# Patient Record
Sex: FEMALE | Race: WHITE | NOT HISPANIC OR LATINO | Employment: UNEMPLOYED | URBAN - NONMETROPOLITAN AREA
[De-identification: names, ages, dates, MRNs, and addresses within clinical notes are randomized per-mention and may not be internally consistent; named-entity substitution may affect disease eponyms.]

---
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
## 2022-02-10 ENCOUNTER — TELEPHONE (OUTPATIENT)
Dept: OBSTETRICS AND GYNECOLOGY | Facility: CLINIC | Age: 22
End: 2022-02-10

## 2022-02-10 NOTE — TELEPHONE ENCOUNTER
Patient took 2 home pregnancy tests. One was negative and one positive. She asked for labs to confirm quant levels. Does she need to be seen prior or can lab orders be put in? Patient can be reached at #[phone] to discuss further if necessary.    Thanks,  Lita

## 2022-02-11 NOTE — TELEPHONE ENCOUNTER
I spoke with patient and informed her to wait a week from today before taking another pregnancy test. Her last LMP was mid January. I told her if its still positive we would run a quant. She had two positives and a negative at home pregnancy tests.

## 2022-03-28 ENCOUNTER — TELEPHONE (OUTPATIENT)
Dept: OBSTETRICS AND GYNECOLOGY | Facility: CLINIC | Age: 22
End: 2022-03-28

## 2022-03-28 ENCOUNTER — LAB (OUTPATIENT)
Dept: LAB | Facility: HOSPITAL | Age: 22
End: 2022-03-28

## 2022-03-28 ENCOUNTER — CLINICAL SUPPORT (OUTPATIENT)
Dept: OBSTETRICS AND GYNECOLOGY | Facility: CLINIC | Age: 22
End: 2022-03-28

## 2022-03-28 VITALS
WEIGHT: 206 LBS | DIASTOLIC BLOOD PRESSURE: 66 MMHG | SYSTOLIC BLOOD PRESSURE: 114 MMHG | HEIGHT: 61 IN | BODY MASS INDEX: 38.89 KG/M2

## 2022-03-28 DIAGNOSIS — Z32.00 PREGNANCY EXAMINATION OR TEST, PREGNANCY UNCONFIRMED: Primary | ICD-10-CM

## 2022-03-28 LAB
B-HCG UR QL: NEGATIVE
EXPIRATION DATE: ABNORMAL
HCG INTACT+B SERPL-ACNC: <0.1 MIU/ML
INTERNAL NEGATIVE CONTROL: NEGATIVE
INTERNAL POSITIVE CONTROL: POSITIVE
Lab: ABNORMAL

## 2022-03-28 PROCEDURE — 36415 COLL VENOUS BLD VENIPUNCTURE: CPT

## 2022-03-28 PROCEDURE — 81025 URINE PREGNANCY TEST: CPT | Performed by: NURSE PRACTITIONER

## 2022-03-28 PROCEDURE — 84702 CHORIONIC GONADOTROPIN TEST: CPT | Performed by: NURSE PRACTITIONER

## 2022-03-28 NOTE — TELEPHONE ENCOUNTER
I called patient with her beta HCG quant level results. Her quant level today is negative. I asked the patient if she would like to get on birth control to help regulate her periods. She told me she is not sure right now. I told her that if she decides to, to call us so we can get her an appointment with a provider. I told the patient that if she has any other questions to please give me a call.

## 2022-03-28 NOTE — PROGRESS NOTES
Patient comes in for a newob appointment. She says she had a positive pregnancy test. Her LMP is approximately 1/28/22. Her UPT is negative in the office today. She says she has had 7 miscarriages. She only saw a doctor for followup for one of the miscarriages. A beta HCG quant is ordered today. I told the patient that I would call her with results. Patient is agreeable to this.

## 2022-04-09 ENCOUNTER — HOSPITAL ENCOUNTER (EMERGENCY)
Facility: HOSPITAL | Age: 22
Discharge: HOME | End: 2022-04-09
Attending: EMERGENCY MEDICINE | Admitting: EMERGENCY MEDICINE

## 2022-04-09 VITALS
BODY MASS INDEX: 38.89 KG/M2 | RESPIRATION RATE: 16 BRPM | DIASTOLIC BLOOD PRESSURE: 57 MMHG | HEIGHT: 61 IN | OXYGEN SATURATION: 97 % | HEART RATE: 78 BPM | WEIGHT: 206 LBS | SYSTOLIC BLOOD PRESSURE: 102 MMHG | TEMPERATURE: 97.3 F

## 2022-04-09 DIAGNOSIS — N93.9 VAGINAL BLEEDING: Primary | ICD-10-CM

## 2022-04-09 LAB — HCG SERPL QL: NEGATIVE

## 2022-04-09 PROCEDURE — 99282 EMERGENCY DEPT VISIT SF MDM: CPT

## 2022-04-09 PROCEDURE — 36415 COLL VENOUS BLD VENIPUNCTURE: CPT

## 2022-04-09 PROCEDURE — 84703 CHORIONIC GONADOTROPIN ASSAY: CPT | Performed by: EMERGENCY MEDICINE

## 2022-04-09 RX ORDER — SODIUM CHLORIDE 0.9 % (FLUSH) 0.9 %
10 SYRINGE (ML) INJECTION AS NEEDED
Status: DISCONTINUED | OUTPATIENT
Start: 2022-04-09 | End: 2022-04-09 | Stop reason: HOSPADM

## 2022-04-09 NOTE — ED PROVIDER NOTES
Subjective   22-year-old female presents to the emergency department with concern for vaginal bleeding with reported positive home pregnancy test.  She initially stated that the home pregnancy test was on 15 March but [facility]  After I question this she reports that she did get mixed up and she actually has taken 8 further pregnancy test since the 28th that it been positive at home.  She reports she started bleeding this evening and having some cramping and is concerned about issues with miscarriage due to reported previous 7 miscarriages.  She does state that she has not followed with OB/GYN during any of these reported pregnancies.  She did have 1 visit to this emergency department for one of them but the pregnancy test in the ED was negative at that time.  She is not on any birth control currently.  Reports her menstrual cycle usually occurs at the beginning of each month and that she has not had one since the beginning of February.  She reports her blood type is O-.  When asked if she is ever received RhoGam with her previous miscarriages she does not know what this medicine is.  Denies receiving any previous injections with her miscarriages.    Family history, surgical history, social history, current medications and allergies are reviewed with the patient and triage documentation and vitals are reviewed.      History provided by:  Patient and medical records   used: No        Review of Systems   Constitutional: Negative for chills and fever.   HENT: Negative for congestion and sore throat.    Eyes: Negative.    Respiratory: Negative.    Cardiovascular: Negative.    Gastrointestinal: Positive for abdominal pain. Negative for constipation, diarrhea, nausea and vomiting.   Endocrine: Negative.    Genitourinary: Positive for menstrual problem, pelvic pain and vaginal bleeding. Negative for dysuria, frequency and vaginal  discharge.   Musculoskeletal: Negative.    Skin: Negative.    Allergic/Immunologic: Negative.    Neurological: Negative.    Hematological: Negative.    Psychiatric/Behavioral: Negative.        Past Medical History:   Diagnosis Date   • Asthma    • Chlamydia        No Known Allergies    Past Surgical History:   Procedure Laterality Date   • ADENOIDECTOMY     • TONSILLECTOMY         Family History   Problem Relation Age of Onset   • No Known Problems Father    • No Known Problems Mother    • No Known Problems Brother    • No Known Problems Brother    • No Known Problems Brother    • Heart attack Paternal Grandfather    • Hypertension Maternal Grandmother    • Cancer Maternal Grandfather        Social History     Socioeconomic History   • Marital status: Single   Tobacco Use   • Smoking status: Current Every Day Smoker     Packs/day: 1.00     Types: Cigarettes   • Smokeless tobacco: Never Used   Substance and Sexual Activity   • Alcohol use: Not Currently   • Drug use: Never   • Sexual activity: Yes     Partners: Male           Objective   Physical Exam  Vitals and nursing note reviewed.   Constitutional:       General: She is not in acute distress.     Appearance: Normal appearance. She is normal weight. She is not ill-appearing, toxic-appearing or diaphoretic.   Cardiovascular:      Rate and Rhythm: Normal rate and regular rhythm.      Pulses: Normal pulses.      Heart sounds: No murmur heard.  Pulmonary:      Effort: Pulmonary effort is normal.      Breath sounds: Normal breath sounds.   Abdominal:      General: Bowel sounds are normal.      Palpations: Abdomen is soft.      Tenderness: There is no abdominal tenderness. There is no guarding or rebound.   Musculoskeletal:         General: Normal range of motion.      Cervical back: Normal range of motion.   Skin:     General: Skin is warm and dry.      Capillary Refill: Capillary refill takes less than 2 seconds.      Coloration: Skin is not pale.   Neurological:       Mental Status: She is alert.         Procedures  none         ED Course      Labs Reviewed   HCG, SERUM, QUALITATIVE - Normal     No results found.             MDM  Number of Diagnoses or Management Options     Amount and/or Complexity of Data Reviewed  Clinical lab tests: reviewed    Patient Progress  Patient progress: stable    Pregnancy test in the emergency department is negative.  Patient advised on follow-up with OB/GYN.    Final diagnoses:   Vaginal bleeding       ED Disposition  ED Disposition     ED Disposition   Discharge    Condition   Stable    Comment   --             Jeremie Galicia MD  [address] [place] [id number] [phone]               Medication List      No changes were made to your prescriptions during this visit.          Paul Sparks,   04/09/22 212

## 2022-04-29 ENCOUNTER — OFFICE VISIT (OUTPATIENT)
Dept: OBSTETRICS AND GYNECOLOGY | Facility: CLINIC | Age: 22
End: 2022-04-29

## 2022-04-29 VITALS
WEIGHT: 201 LBS | DIASTOLIC BLOOD PRESSURE: 64 MMHG | SYSTOLIC BLOOD PRESSURE: 108 MMHG | BODY MASS INDEX: 37.95 KG/M2 | HEIGHT: 61 IN

## 2022-04-29 DIAGNOSIS — Z32.00 PREGNANCY EXAMINATION OR TEST, PREGNANCY UNCONFIRMED: ICD-10-CM

## 2022-04-29 DIAGNOSIS — N96 HISTORY OF MULTIPLE MISCARRIAGES: Primary | ICD-10-CM

## 2022-04-29 DIAGNOSIS — Z67.91 BLOOD TYPE, RH NEGATIVE: ICD-10-CM

## 2022-04-29 DIAGNOSIS — Z30.09 BIRTH CONTROL COUNSELING: ICD-10-CM

## 2022-04-29 LAB
B-HCG UR QL: NEGATIVE
EXPIRATION DATE: 0
INTERNAL NEGATIVE CONTROL: NEGATIVE
INTERNAL POSITIVE CONTROL: POSITIVE
Lab: 0

## 2022-04-29 PROCEDURE — 99203 OFFICE O/P NEW LOW 30 MIN: CPT | Performed by: NURSE PRACTITIONER

## 2022-04-29 PROCEDURE — 81025 URINE PREGNANCY TEST: CPT | Performed by: NURSE PRACTITIONER

## 2022-04-29 NOTE — PROGRESS NOTES
Subjective   Shirley Sutton is a 22 y.o. here wants to discuss history of miscarries, desires BC    Shirley Sutton is a 22 yr old premenopausal female who presents today to discuss her history of miscarriages and desires birth control. Last period in February, prior to that sometime in April or May 2021. Wants to know if she would be able to conceive in the future because she has had 5-6 miscarriages in the past. Pt states that her most recent miscarriage  was sometime last year in [place]; she presented to the ER for bleeding and was told her HCG quant was low and she was miscarrying. Pt did not go back for f/u. Also, shares that had at least 3 miscarriages when she was 19 years old but never told anyone and did not seek care for those miscarriages. Reports a history of irregular periods; had been on Depo-Provera for 4 years and got off of it in February 2021. Only has had 2 periods since. Recalls that she had previously gone 6 months without a period and this was when she was not taking birth control. Voices that she desires to be on birth control at this time. Denies having sex in the last 2 weeks. Negative UPT test today.       The following portions of the patient's history were reviewed and updated as appropriate: allergies, current medications, past family history, past medical history, past social history, past surgical history and problem list.    Review of Systems   Constitutional: Negative for chills, fatigue and fever.   Respiratory: Negative for shortness of breath.    Cardiovascular: Negative for chest pain and palpitations.   Gastrointestinal: Negative for abdominal pain, constipation, diarrhea and nausea.   Genitourinary: Negative for dysuria, frequency, menstrual problem, pelvic pressure, vaginal bleeding, vaginal discharge and vaginal pain.   Skin: Negative for rash.   Neurological: Negative for headache.   Psychiatric/Behavioral: Negative for depressed mood.       Objective   Physical  Exam  Vitals and nursing note reviewed.   Constitutional:       Appearance: She is well-developed.   HENT:      Head: Normocephalic.   Pulmonary:      Effort: Pulmonary effort is normal.   Musculoskeletal:         General: Normal range of motion.   Skin:     General: Skin is warm and dry.   Neurological:      Mental Status: She is alert and oriented to person, place, and time.   Psychiatric:         Behavior: Behavior normal.           Assessment/Plan   Diagnoses and all orders for this visit:    1. History of multiple miscarriages (Primary)  -     Cancel: ABO / Rh; Future  -     Cancel: Antibody Screen; Future    2. Blood type, Rh negative  -     Cancel: ABO / Rh; Future  -     Cancel: Antibody Screen; Future    3. Pregnancy examination or test, pregnancy unconfirmed  -     POC Pregnancy, Urine    4. Birth control counseling      Discussed the importance of f/u with miscarriages as her blood type is O negative which Rhogam would be indicated. At this time, patient desires to defer work-up for history of miscarriages as patient does not want to conceive currently. Reviewed contraceptive options to include OCPs, patch, Depo-provera, NuvaRing, IUD, and Nexplanon. Desires Nexplanon at this time but requires PA; will return in 1 week for placement. Counseled patient that if she changes her mind about Nexplanon, it is important she returns for f/u to discuss further management of her irregular periods.    -

## 2022-06-09 ENCOUNTER — HOSPITAL ENCOUNTER (EMERGENCY)
Facility: HOSPITAL | Age: 22
Discharge: HOME | End: 2022-06-09
Attending: EMERGENCY MEDICINE | Admitting: EMERGENCY MEDICINE

## 2022-06-09 VITALS
OXYGEN SATURATION: 99 % | SYSTOLIC BLOOD PRESSURE: 109 MMHG | TEMPERATURE: 98.5 F | WEIGHT: 190 LBS | DIASTOLIC BLOOD PRESSURE: 69 MMHG | HEART RATE: 72 BPM | BODY MASS INDEX: 34.96 KG/M2 | HEIGHT: 62 IN | RESPIRATION RATE: 18 BRPM

## 2022-06-09 DIAGNOSIS — J06.9 VIRAL UPPER RESPIRATORY TRACT INFECTION: Primary | ICD-10-CM

## 2022-06-09 PROCEDURE — 99283 EMERGENCY DEPT VISIT LOW MDM: CPT

## 2022-06-09 RX ORDER — GUAIFENESIN 600 MG/1
1200 TABLET, EXTENDED RELEASE ORAL 2 TIMES DAILY PRN
Qty: 14 TABLET | Refills: 0 | Status: SHIPPED | OUTPATIENT
Start: 2022-06-09 | End: 2022-09-06

## 2022-06-09 RX ORDER — ALBUTEROL SULFATE 90 UG/1
2 AEROSOL, METERED RESPIRATORY (INHALATION) EVERY 4 HOURS PRN
Qty: 6.7 G | Refills: 0 | Status: SHIPPED | OUTPATIENT
Start: 2022-06-09 | End: 2022-09-06

## 2022-06-09 RX ORDER — DEXTROMETHORPHAN HYDROBROMIDE AND PROMETHAZINE HYDROCHLORIDE 15; 6.25 MG/5ML; MG/5ML
5 SYRUP ORAL 4 TIMES DAILY PRN
Qty: 118 ML | Refills: 0 | Status: SHIPPED | OUTPATIENT
Start: 2022-06-09 | End: 2022-09-06

## 2022-06-10 NOTE — ED PROVIDER NOTES
"Subjective   21yo female presents ED c/o 5d hx congestion/productive cough \"clear\" sputum/sinus pressure/otalgia/sore throat.  Denies fever/chills/n/v/d.      History provided by:  Patient  URI  Presenting symptoms: congestion, cough, ear pain and sore throat    Severity:  Moderate  Duration:  5 days  Associated symptoms: no wheezing        Review of Systems   Constitutional: Negative.    HENT: Positive for congestion, ear pain and sore throat.    Respiratory: Positive for cough and shortness of breath. Negative for wheezing.    Cardiovascular: Negative.    Gastrointestinal: Negative.    Genitourinary: Negative.    Musculoskeletal: Negative.    Allergic/Immunologic: Negative for immunocompromised state.   All other systems reviewed and are negative.      Past Medical History:   Diagnosis Date   • Asthma    • Chlamydia        No Known Allergies    Past Surgical History:   Procedure Laterality Date   • ADENOIDECTOMY     • TONSILLECTOMY         Family History   Problem Relation Age of Onset   • No Known Problems Father    • No Known Problems Mother    • No Known Problems Brother    • No Known Problems Brother    • No Known Problems Brother    • Heart attack Paternal Grandfather    • Hypertension Maternal Grandmother    • Cancer Maternal Grandfather        Social History     Socioeconomic History   • Marital status: Single   Tobacco Use   • Smoking status: Current Every Day Smoker     Packs/day: 1.00     Types: Cigarettes   • Smokeless tobacco: Never Used   Substance and Sexual Activity   • Alcohol use: Not Currently   • Drug use: Never   • Sexual activity: Yes     Partners: Male           Objective   Physical Exam  Vitals and nursing note reviewed.   Constitutional:       Appearance: Normal appearance.   HENT:      Head: Normocephalic and atraumatic.      Right Ear: Tympanic membrane, ear canal and external ear normal.      Left Ear: Tympanic membrane, ear canal and external ear normal.      Nose:      Right Sinus: " Maxillary sinus tenderness present. No frontal sinus tenderness.      Left Sinus: Maxillary sinus tenderness present. No frontal sinus tenderness.      Mouth/Throat:      Mouth: Mucous membranes are moist.   Eyes:      Pupils: Pupils are equal, round, and reactive to light.   Cardiovascular:      Rate and Rhythm: Normal rate and regular rhythm.      Pulses: Normal pulses.      Heart sounds: Normal heart sounds. No murmur heard.    No friction rub. No gallop.   Pulmonary:      Effort: Pulmonary effort is normal. No respiratory distress.      Breath sounds: Normal breath sounds. No stridor. No wheezing, rhonchi or rales.   Abdominal:      General: Abdomen is flat. Bowel sounds are normal. There is no distension.      Palpations: Abdomen is soft.      Tenderness: There is no abdominal tenderness. There is no guarding or rebound.   Musculoskeletal:         General: No swelling or deformity.      Cervical back: Normal range of motion and neck supple. No rigidity or tenderness.      Right lower leg: No edema.      Left lower leg: No edema.   Lymphadenopathy:      Cervical: No cervical adenopathy.   Skin:     General: Skin is warm and dry.   Neurological:      General: No focal deficit present.      Mental Status: She is alert and oriented to person, place, and time.      GCS: GCS eye subscore is 4. GCS verbal subscore is 5. GCS motor subscore is 6.         Procedures           ED Course                                                 MDM    Final diagnoses:   Viral upper respiratory tract infection       ED Disposition  ED Disposition     ED Disposition   Discharge    Condition   Stable    Comment   --             Kim Warren MD  [address] [place] [address] [phone]    In 2 days           Medication List      New Prescriptions    albuterol sulfate  (90 Base) MCG/ACT inhaler  Commonly known as: PROVENTIL HFA;VENTOLIN HFA;PROAIR HFA  Inhale 2 puffs Every 4 (Four) Hours As Needed for Wheezing or  Shortness of Air.     guaiFENesin 600 MG 12 hr tablet  Commonly known as: MUCINEX  Take 2 tablets by mouth 2 (Two) Times a Day As Needed for Cough or Congestion.     promethazine-dextromethorphan 6.25-15 MG/5ML syrup  Commonly known as: PROMETHAZINE-DM  Take 5 mL by mouth 4 (Four) Times a Day As Needed for Cough.           Where to Get Your Medications      These medications were sent to [id number] - [place], [place] - [facility] [facility] AT [facility] [address] - [phone]  - [phone] [address] [address]    Phone: [phone]   · albuterol sulfate  (90 Base) MCG/ACT inhaler  · guaiFENesin 600 MG 12 hr tablet  · promethazine-dextromethorphan 6.25-15 MG/5ML syrup          Ernesto Phelps MD  06/09/22 3121

## 2022-09-06 ENCOUNTER — LAB (OUTPATIENT)
Dept: LAB | Facility: HOSPITAL | Age: 22
End: 2022-09-06

## 2022-09-06 ENCOUNTER — INITIAL PRENATAL (OUTPATIENT)
Dept: OBSTETRICS AND GYNECOLOGY | Facility: CLINIC | Age: 22
End: 2022-09-06

## 2022-09-06 VITALS — SYSTOLIC BLOOD PRESSURE: 100 MMHG | DIASTOLIC BLOOD PRESSURE: 62 MMHG | BODY MASS INDEX: 37.39 KG/M2 | WEIGHT: 204.4 LBS

## 2022-09-06 DIAGNOSIS — O36.80X0 ENCOUNTER TO DETERMINE FETAL VIABILITY OF PREGNANCY, SINGLE OR UNSPECIFIED FETUS: ICD-10-CM

## 2022-09-06 DIAGNOSIS — E66.9 OBESITY (BMI 30-39.9): ICD-10-CM

## 2022-09-06 DIAGNOSIS — Z32.01 POSITIVE PREGNANCY TEST: ICD-10-CM

## 2022-09-06 DIAGNOSIS — N96 HISTORY OF RECURRENT MISCARRIAGES: ICD-10-CM

## 2022-09-06 DIAGNOSIS — O09.299 HISTORY OF MISCARRIAGE, CURRENTLY PREGNANT, UNSPECIFIED TRIMESTER: Primary | ICD-10-CM

## 2022-09-06 DIAGNOSIS — Z78.9 DATE OF LAST MENSTRUAL PERIOD (LMP) UNKNOWN: ICD-10-CM

## 2022-09-06 DIAGNOSIS — Z32.00 PREGNANCY EXAMINATION OR TEST, PREGNANCY UNCONFIRMED: ICD-10-CM

## 2022-09-06 LAB
ABO GROUP BLD: NORMAL
AMPHET+METHAMPHET UR QL: NEGATIVE
AMPHETAMINES UR QL: NEGATIVE
B-HCG UR QL: POSITIVE
BARBITURATES UR QL SCN: NEGATIVE
BASOPHILS # BLD AUTO: 0.03 10*3/MM3 (ref 0–0.2)
BASOPHILS NFR BLD AUTO: 0.3 % (ref 0–1.5)
BENZODIAZ UR QL SCN: NEGATIVE
BILIRUB UR QL STRIP: NEGATIVE
BLD GP AB SCN SERPL QL: NEGATIVE
BUPRENORPHINE SERPL-MCNC: NEGATIVE NG/ML
CANNABINOIDS SERPL QL: NEGATIVE
CLARITY UR: CLEAR
COCAINE UR QL: NEGATIVE
COLOR UR: YELLOW
DEPRECATED RDW RBC AUTO: 43.8 FL (ref 37–54)
EOSINOPHIL # BLD AUTO: 0.12 10*3/MM3 (ref 0–0.4)
EOSINOPHIL NFR BLD AUTO: 1.1 % (ref 0.3–6.2)
ERYTHROCYTE [DISTWIDTH] IN BLOOD BY AUTOMATED COUNT: 13.2 % (ref 12.3–15.4)
EXPIRATION DATE: ABNORMAL
GLUCOSE UR STRIP-MCNC: NEGATIVE MG/DL
HBV SURFACE AG SERPL QL IA: NORMAL
HCG INTACT+B SERPL-ACNC: NORMAL MIU/ML
HCT VFR BLD AUTO: 39.4 % (ref 34–46.6)
HCV AB SER DONR QL: NORMAL
HGB BLD-MCNC: 13.1 G/DL (ref 12–15.9)
HGB UR QL STRIP.AUTO: NEGATIVE
HIV1+2 AB SER QL: NORMAL
IMM GRANULOCYTES # BLD AUTO: 0.03 10*3/MM3 (ref 0–0.05)
IMM GRANULOCYTES NFR BLD AUTO: 0.3 % (ref 0–0.5)
INTERNAL NEGATIVE CONTROL: NEGATIVE
INTERNAL POSITIVE CONTROL: POSITIVE
KETONES UR QL STRIP: NEGATIVE
LEUKOCYTE ESTERASE UR QL STRIP.AUTO: NEGATIVE
LYMPHOCYTES # BLD AUTO: 1.48 10*3/MM3 (ref 0.7–3.1)
LYMPHOCYTES NFR BLD AUTO: 13.7 % (ref 19.6–45.3)
Lab: ABNORMAL
Lab: NORMAL
MCH RBC QN AUTO: 30.2 PG (ref 26.6–33)
MCHC RBC AUTO-ENTMCNC: 33.2 G/DL (ref 31.5–35.7)
MCV RBC AUTO: 90.8 FL (ref 79–97)
METHADONE UR QL SCN: NEGATIVE
MONOCYTES # BLD AUTO: 0.76 10*3/MM3 (ref 0.1–0.9)
MONOCYTES NFR BLD AUTO: 7 % (ref 5–12)
NEUTROPHILS NFR BLD AUTO: 77.6 % (ref 42.7–76)
NEUTROPHILS NFR BLD AUTO: 8.42 10*3/MM3 (ref 1.7–7)
NITRITE UR QL STRIP: NEGATIVE
NRBC BLD AUTO-RTO: 0 /100 WBC (ref 0–0.2)
OPIATES UR QL: NEGATIVE
OXYCODONE UR QL SCN: NEGATIVE
PCP UR QL SCN: NEGATIVE
PH UR STRIP.AUTO: 7 [PH] (ref 5–8)
PLATELET # BLD AUTO: 337 10*3/MM3 (ref 140–450)
PMV BLD AUTO: 9.3 FL (ref 6–12)
PROPOXYPH UR QL: NEGATIVE
PROT UR QL STRIP: NEGATIVE
RBC # BLD AUTO: 4.34 10*6/MM3 (ref 3.77–5.28)
RH BLD: NEGATIVE
SP GR UR STRIP: 1.02 (ref 1–1.03)
TRICYCLICS UR QL SCN: NEGATIVE
UROBILINOGEN UR QL STRIP: NORMAL
WBC NRBC COR # BLD: 10.84 10*3/MM3 (ref 3.4–10.8)

## 2022-09-06 PROCEDURE — 81025 URINE PREGNANCY TEST: CPT | Performed by: STUDENT IN AN ORGANIZED HEALTH CARE EDUCATION/TRAINING PROGRAM

## 2022-09-06 PROCEDURE — 87661 TRICHOMONAS VAGINALIS AMPLIF: CPT | Performed by: STUDENT IN AN ORGANIZED HEALTH CARE EDUCATION/TRAINING PROGRAM

## 2022-09-06 PROCEDURE — 84702 CHORIONIC GONADOTROPIN TEST: CPT | Performed by: STUDENT IN AN ORGANIZED HEALTH CARE EDUCATION/TRAINING PROGRAM

## 2022-09-06 PROCEDURE — 86803 HEPATITIS C AB TEST: CPT | Performed by: STUDENT IN AN ORGANIZED HEALTH CARE EDUCATION/TRAINING PROGRAM

## 2022-09-06 PROCEDURE — 80081 OBSTETRIC PANEL INC HIV TSTG: CPT | Performed by: STUDENT IN AN ORGANIZED HEALTH CARE EDUCATION/TRAINING PROGRAM

## 2022-09-06 PROCEDURE — 81003 URINALYSIS AUTO W/O SCOPE: CPT | Performed by: STUDENT IN AN ORGANIZED HEALTH CARE EDUCATION/TRAINING PROGRAM

## 2022-09-06 PROCEDURE — 80306 DRUG TEST PRSMV INSTRMNT: CPT | Performed by: STUDENT IN AN ORGANIZED HEALTH CARE EDUCATION/TRAINING PROGRAM

## 2022-09-06 PROCEDURE — 87491 CHLMYD TRACH DNA AMP PROBE: CPT | Performed by: STUDENT IN AN ORGANIZED HEALTH CARE EDUCATION/TRAINING PROGRAM

## 2022-09-06 PROCEDURE — 87086 URINE CULTURE/COLONY COUNT: CPT | Performed by: STUDENT IN AN ORGANIZED HEALTH CARE EDUCATION/TRAINING PROGRAM

## 2022-09-06 PROCEDURE — 87591 N.GONORRHOEAE DNA AMP PROB: CPT | Performed by: STUDENT IN AN ORGANIZED HEALTH CARE EDUCATION/TRAINING PROGRAM

## 2022-09-06 PROCEDURE — 36415 COLL VENOUS BLD VENIPUNCTURE: CPT

## 2022-09-06 RX ORDER — PRENATAL VIT NO.126/IRON/FOLIC 28MG-0.8MG
TABLET ORAL DAILY
COMMUNITY
End: 2022-09-21

## 2022-09-06 NOTE — PROGRESS NOTES
I spent approximately 60 minutes with the patient acquiring the health and history intake, reviewing prior records, discussing topics related to healthy pregnancy, entering orders, and documentation. Her LMP is unknown. She says she does not have regular periods. She had a positive UPT in the office today.  She says she went to the health department and signed up for [facility], but she forgot to bring proof of pregnancy. She is also considering doing HANDS. She tells me this is her 5th pregnancy. She says she has had 4 miscarriages but only saw a doctor for one of them.   She has history of depression. She is not currently on any medication. She filled out the depression screening questionnaire and scored 7.   A newob bag is given. The 1st trimester teaching was done with the patient. We discussed a healthy diet and exercise and what is recommended. I also discussed Listeriosis and Toxoplasmosis and what fish to avoid due to high mercury levels. I informed patient not to be in hot tubs, saunas, or tanning beds. We discussed that spotting may occur after intercourse which is common, but if heavy bleeding like a period occurs to call the [facility] or hospital if clinic is closed.  I encouraged her to make an appointment with the dentist if she has not had a dental exam and cleaning in the last 6 months.  I instructed the patient that alcohol, illicit drug use, and tobacco smoking are not recommended in pregnancy. She recently stopped smoking cigarettes.  She plans to formula feed. I talked to her about education classes. I told her that we are doing in-person classes now. I gave her a schedule of the upcoming classes. I encouraged the patient to get the TDAP vaccine in the 3rd trimester.  I discussed with the patient that a pediatrician needs to be chosen prior to delivery for the infant to have an appointment scheduled before leaving the hospital.  I discussed lab tests will be done today. She is interested in genetics  testing. She will need an early 1 hr glucola for BMI 36. All questions were answered at this time. She is scheduled for an ultrasound and to see Yasmin GODOY on 9/21/22.

## 2022-09-07 LAB
BACTERIA SPEC AEROBE CULT: NORMAL
C TRACH RRNA CVX QL NAA+PROBE: NEGATIVE
N GONORRHOEA RRNA SPEC QL NAA+PROBE: NEGATIVE
RPR SER QL: NORMAL
RUBV IGG SERPL IA-ACNC: 1.29 INDEX
TRICHOMONAS VAGINALIS PCR: NEGATIVE

## 2022-09-10 ENCOUNTER — HOSPITAL ENCOUNTER (EMERGENCY)
Facility: HOSPITAL | Age: 22
Discharge: HOME | End: 2022-09-11
Attending: EMERGENCY MEDICINE | Admitting: EMERGENCY MEDICINE

## 2022-09-10 DIAGNOSIS — R79.89 LFTS ABNORMAL: ICD-10-CM

## 2022-09-10 DIAGNOSIS — O21.9 NAUSEA/VOMITING IN PREGNANCY: Primary | ICD-10-CM

## 2022-09-10 PROCEDURE — 96361 HYDRATE IV INFUSION ADD-ON: CPT

## 2022-09-10 PROCEDURE — 99283 EMERGENCY DEPT VISIT LOW MDM: CPT

## 2022-09-10 PROCEDURE — 96374 THER/PROPH/DIAG INJ IV PUSH: CPT

## 2022-09-11 ENCOUNTER — APPOINTMENT (OUTPATIENT)
Dept: ULTRASOUND IMAGING | Facility: HOSPITAL | Age: 22
End: 2022-09-11

## 2022-09-11 VITALS
SYSTOLIC BLOOD PRESSURE: 102 MMHG | DIASTOLIC BLOOD PRESSURE: 49 MMHG | TEMPERATURE: 98.2 F | HEART RATE: 66 BPM | WEIGHT: 205 LBS | RESPIRATION RATE: 18 BRPM | HEIGHT: 62 IN | BODY MASS INDEX: 37.73 KG/M2 | OXYGEN SATURATION: 99 %

## 2022-09-11 LAB
ALBUMIN SERPL-MCNC: 4.1 G/DL (ref 3.5–5.2)
ALBUMIN/GLOB SERPL: 1.6 G/DL
ALP SERPL-CCNC: 75 U/L (ref 39–117)
ALT SERPL W P-5'-P-CCNC: 157 U/L (ref 1–33)
ANION GAP SERPL CALCULATED.3IONS-SCNC: 12 MMOL/L (ref 5–15)
AST SERPL-CCNC: 153 U/L (ref 1–32)
BACTERIA UR QL AUTO: ABNORMAL /HPF
BASOPHILS # BLD AUTO: 0.04 10*3/MM3 (ref 0–0.2)
BASOPHILS NFR BLD AUTO: 0.3 % (ref 0–1.5)
BILIRUB SERPL-MCNC: 0.3 MG/DL (ref 0–1.2)
BILIRUB UR QL STRIP: NEGATIVE
BUN SERPL-MCNC: 5 MG/DL (ref 6–20)
BUN/CREAT SERPL: 7.9 (ref 7–25)
CALCIUM SPEC-SCNC: 10.2 MG/DL (ref 8.6–10.5)
CHLORIDE SERPL-SCNC: 100 MMOL/L (ref 98–107)
CLARITY UR: ABNORMAL
CO2 SERPL-SCNC: 25 MMOL/L (ref 22–29)
COLOR UR: ABNORMAL
CREAT SERPL-MCNC: 0.63 MG/DL (ref 0.57–1)
DEPRECATED RDW RBC AUTO: 44 FL (ref 37–54)
EGFRCR SERPLBLD CKD-EPI 2021: 130.4 ML/MIN/1.73
EOSINOPHIL # BLD AUTO: 0.03 10*3/MM3 (ref 0–0.4)
EOSINOPHIL NFR BLD AUTO: 0.3 % (ref 0.3–6.2)
ERYTHROCYTE [DISTWIDTH] IN BLOOD BY AUTOMATED COUNT: 13.4 % (ref 12.3–15.4)
GLOBULIN UR ELPH-MCNC: 2.6 GM/DL
GLUCOSE SERPL-MCNC: 84 MG/DL (ref 65–99)
GLUCOSE UR STRIP-MCNC: NEGATIVE MG/DL
HAV IGM SERPL QL IA: NORMAL
HBV CORE IGM SERPL QL IA: NORMAL
HBV SURFACE AG SERPL QL IA: NORMAL
HCG INTACT+B SERPL-ACNC: NORMAL MIU/ML
HCT VFR BLD AUTO: 35.6 % (ref 34–46.6)
HCV AB SER DONR QL: NORMAL
HGB BLD-MCNC: 12.1 G/DL (ref 12–15.9)
HGB UR QL STRIP.AUTO: NEGATIVE
HYALINE CASTS UR QL AUTO: ABNORMAL /LPF
IMM GRANULOCYTES # BLD AUTO: 0.08 10*3/MM3 (ref 0–0.05)
IMM GRANULOCYTES NFR BLD AUTO: 0.7 % (ref 0–0.5)
KETONES UR QL STRIP: ABNORMAL
LEUKOCYTE ESTERASE UR QL STRIP.AUTO: ABNORMAL
LIPASE SERPL-CCNC: 11 U/L (ref 13–60)
LYMPHOCYTES # BLD AUTO: 2.26 10*3/MM3 (ref 0.7–3.1)
LYMPHOCYTES NFR BLD AUTO: 19.5 % (ref 19.6–45.3)
MCH RBC QN AUTO: 30.6 PG (ref 26.6–33)
MCHC RBC AUTO-ENTMCNC: 34 G/DL (ref 31.5–35.7)
MCV RBC AUTO: 89.9 FL (ref 79–97)
MONOCYTES # BLD AUTO: 0.91 10*3/MM3 (ref 0.1–0.9)
MONOCYTES NFR BLD AUTO: 7.9 % (ref 5–12)
NEUTROPHILS NFR BLD AUTO: 71.3 % (ref 42.7–76)
NEUTROPHILS NFR BLD AUTO: 8.25 10*3/MM3 (ref 1.7–7)
NITRITE UR QL STRIP: NEGATIVE
NRBC BLD AUTO-RTO: 0 /100 WBC (ref 0–0.2)
PH UR STRIP.AUTO: 6 [PH] (ref 5–9)
PLATELET # BLD AUTO: 316 10*3/MM3 (ref 140–450)
PMV BLD AUTO: 9 FL (ref 6–12)
POTASSIUM SERPL-SCNC: 3.6 MMOL/L (ref 3.5–5.2)
PROT SERPL-MCNC: 6.7 G/DL (ref 6–8.5)
PROT UR QL STRIP: NEGATIVE
RBC # BLD AUTO: 3.96 10*6/MM3 (ref 3.77–5.28)
RBC # UR STRIP: ABNORMAL /HPF
REF LAB TEST METHOD: ABNORMAL
SODIUM SERPL-SCNC: 137 MMOL/L (ref 136–145)
SP GR UR STRIP: 1.03 (ref 1–1.03)
SQUAMOUS #/AREA URNS HPF: ABNORMAL /HPF
UROBILINOGEN UR QL STRIP: ABNORMAL
WBC # UR STRIP: ABNORMAL /HPF
WBC NRBC COR # BLD: 11.57 10*3/MM3 (ref 3.4–10.8)

## 2022-09-11 PROCEDURE — 83690 ASSAY OF LIPASE: CPT | Performed by: EMERGENCY MEDICINE

## 2022-09-11 PROCEDURE — 80053 COMPREHEN METABOLIC PANEL: CPT | Performed by: EMERGENCY MEDICINE

## 2022-09-11 PROCEDURE — 96361 HYDRATE IV INFUSION ADD-ON: CPT

## 2022-09-11 PROCEDURE — 25010000002 METOCLOPRAMIDE PER 10 MG: Performed by: EMERGENCY MEDICINE

## 2022-09-11 PROCEDURE — 76705 ECHO EXAM OF ABDOMEN: CPT

## 2022-09-11 PROCEDURE — 80074 ACUTE HEPATITIS PANEL: CPT | Performed by: EMERGENCY MEDICINE

## 2022-09-11 PROCEDURE — 85025 COMPLETE CBC W/AUTO DIFF WBC: CPT | Performed by: EMERGENCY MEDICINE

## 2022-09-11 PROCEDURE — 81001 URINALYSIS AUTO W/SCOPE: CPT | Performed by: EMERGENCY MEDICINE

## 2022-09-11 PROCEDURE — 84702 CHORIONIC GONADOTROPIN TEST: CPT | Performed by: EMERGENCY MEDICINE

## 2022-09-11 PROCEDURE — 96374 THER/PROPH/DIAG INJ IV PUSH: CPT

## 2022-09-11 RX ORDER — METOCLOPRAMIDE HYDROCHLORIDE 5 MG/ML
10 INJECTION INTRAMUSCULAR; INTRAVENOUS ONCE
Status: COMPLETED | OUTPATIENT
Start: 2022-09-11 | End: 2022-09-11

## 2022-09-11 RX ORDER — SODIUM CHLORIDE 0.9 % (FLUSH) 0.9 %
10 SYRINGE (ML) INJECTION AS NEEDED
Status: DISCONTINUED | OUTPATIENT
Start: 2022-09-11 | End: 2022-09-11 | Stop reason: HOSPADM

## 2022-09-11 RX ORDER — DOXYLAMINE SUCCINATE AND PYRIDOXINE HYDROCHLORIDE, DELAYED RELEASE TABLETS 10 MG/10 MG 10; 10 MG/1; MG/1
2 TABLET, DELAYED RELEASE ORAL NIGHTLY PRN
Qty: 30 TABLET | Refills: 0 | OUTPATIENT
Start: 2022-09-11 | End: 2022-09-30

## 2022-09-11 RX ADMIN — SODIUM CHLORIDE 1000 ML: 9 INJECTION, SOLUTION INTRAVENOUS at 00:57

## 2022-09-11 RX ADMIN — METOCLOPRAMIDE 10 MG: 5 INJECTION, SOLUTION INTRAMUSCULAR; INTRAVENOUS at 00:58

## 2022-09-11 NOTE — DISCHARGE INSTRUCTIONS
Return ED fever, abdominal pain, jaundice, dehydration, vaginal bleeding, contractions, worse condition, any other concerns

## 2022-09-11 NOTE — ED PROVIDER NOTES
Subjective   PIT    23yo female , unknown LNMP, stating she is approximately 8-9wks pregnant (estimated), presents ED c/o 2wk hx persistent nausea/vomiting.  Pt denies fever/cough/abd pain/diarrhea/vaginal bleeding/contractions/loss of fluid/dysuria.      History provided by:  Patient  Vomiting  The primary symptoms include nausea and vomiting. Primary symptoms do not include abdominal pain, diarrhea or dysuria.       Review of Systems   Constitutional: Negative.    HENT: Negative.    Eyes: Negative for redness.   Respiratory: Negative.    Cardiovascular: Negative.    Gastrointestinal: Positive for nausea and vomiting. Negative for abdominal pain and diarrhea.   Genitourinary: Negative.  Negative for dysuria, pelvic pain and vaginal bleeding.   Musculoskeletal: Negative.    Skin: Negative.    Allergic/Immunologic: Negative for immunocompromised state.   All other systems reviewed and are negative.      Past Medical History:   Diagnosis Date   • Asthma    • Chlamydia    • Depression    • Recurrent pregnancy loss, antepartum condition or complication        No Known Allergies    Past Surgical History:   Procedure Laterality Date   • ADENOIDECTOMY     • TONSILLECTOMY         Family History   Problem Relation Age of Onset   • No Known Problems Father    • No Known Problems Mother    • No Known Problems Brother    • No Known Problems Brother    • No Known Problems Brother    • Heart attack Paternal Grandfather    • Hypertension Maternal Grandmother    • Cancer Maternal Grandfather        Social History     Socioeconomic History   • Marital status: Single   Tobacco Use   • Smoking status: Former Smoker     Packs/day: 1.00     Types: Cigarettes     Quit date: 2022     Years since quittin.0   • Smokeless tobacco: Never Used   Vaping Use   • Vaping Use: Never used   Substance and Sexual Activity   • Alcohol use: Not Currently   • Drug use: Never   • Sexual activity: Yes     Partners: Male           Objective    Physical Exam  Vitals and nursing note reviewed.   Constitutional:       Appearance: Normal appearance.   HENT:      Head: Normocephalic and atraumatic.      Mouth/Throat:      Mouth: Mucous membranes are moist.   Eyes:      Pupils: Pupils are equal, round, and reactive to light.   Cardiovascular:      Rate and Rhythm: Normal rate and regular rhythm.      Pulses: Normal pulses.      Heart sounds: Normal heart sounds. No murmur heard.    No friction rub. No gallop.   Pulmonary:      Effort: Pulmonary effort is normal. No respiratory distress.      Breath sounds: Normal breath sounds. No wheezing, rhonchi or rales.   Abdominal:      General: Abdomen is flat. Bowel sounds are normal. There is no distension.      Palpations: Abdomen is soft.      Tenderness: There is no abdominal tenderness. There is no guarding or rebound.   Musculoskeletal:      Cervical back: Normal range of motion and neck supple. No rigidity.      Right lower leg: No edema.      Left lower leg: No edema.   Lymphadenopathy:      Cervical: No cervical adenopathy.   Skin:     General: Skin is warm and dry.   Neurological:      General: No focal deficit present.      Mental Status: She is alert and oriented to person, place, and time.      GCS: GCS eye subscore is 4. GCS verbal subscore is 5. GCS motor subscore is 6.         Procedures           ED Course      Labs Reviewed   COMPREHENSIVE METABOLIC PANEL - Abnormal; Notable for the following components:       Result Value    BUN 5 (*)     ALT (SGPT) 157 (*)     AST (SGOT) 153 (*)     All other components within normal limits    Narrative:     GFR Normal >60  Chronic Kidney Disease <60  Kidney Failure <15     LIPASE - Abnormal; Notable for the following components:    Lipase 11 (*)     All other components within normal limits   URINALYSIS W/ MICROSCOPIC IF INDICATED (NO CULTURE) - Abnormal; Notable for the following components:    Appearance, UA Cloudy (*)     Ketones, UA Trace (*)     Leuk  Esterase, UA Trace (*)     All other components within normal limits   CBC WITH AUTO DIFFERENTIAL - Abnormal; Notable for the following components:    WBC 11.57 (*)     Lymphocyte % 19.5 (*)     Immature Grans % 0.7 (*)     Neutrophils, Absolute 8.25 (*)     Monocytes, Absolute 0.91 (*)     Immature Grans, Absolute 0.08 (*)     All other components within normal limits   URINALYSIS, MICROSCOPIC ONLY - Abnormal; Notable for the following components:    RBC, UA 3-5 (*)     Bacteria, UA Trace (*)     Squamous Epithelial Cells, UA 6-12 (*)     All other components within normal limits   HEPATITIS PANEL, ACUTE - Normal    Narrative:     Results may be falsely decreased if patient taking Biotin.    HCG, QUANTITATIVE, PREGNANCY    Narrative:     HCG Ranges by Gestational Age    Females - non-pregnant premenopausal   </= 1mIU/mL HCG  Females - postmenopausal               </= 7mIU/mL HCG    3 Weeks         5.8 -    71.2 mIU/mL  4 Weeks         9.5 -     750 mIU/mL  5 Weeks         217 -   7,138 mIU/mL  6 Weeks         158 -  31,795 mIU/mL  7 Weeks       3,697 - 163,563 mIU/mL  8 Weeks      32,065 - 149,571 mIU/mL  9 Weeks      63,803 - 151,410 mIU/mL  10 Weeks     46,509 - 186,977 mIU/mL  12 Weeks     27,832 - 210,612 mIU/mL  14 Weeks     13,950 -  62,530 mIU/mL  15 Weeks     12,039 -  70,971 mIU/mL  16 Weeks      9,040 -  56,451 mIU/mL  17 Weeks      8,175 -  55,868 mIU/mL  18 Weeks      8,099 -  58,176 mIU/mL  Results may be falsely decreased if patient taking Biotin.     CBC AND DIFFERENTIAL    Narrative:     The following orders were created for panel order CBC & Differential.  Procedure                               Abnormality         Status                     ---------                               -----------         ------                     CBC Auto Differential[[id number]]        Abnormal            Final result                 Please view results for these tests on the individual orders.     US Liver    Result  Date: 9/11/2022  Narrative: EXAM DESCRIPTION: US LIVER RadLex: US LIVER Technique:  Multiple sonographic images of the right upper quadrant were obtained. CLINICAL HISTORY: hepatitis. COMPARISON: CT abdomen 9/15/2020. FINDINGS: The liver demonstrates a coarsened echotexture without discrete mass. Hepatic and portal veins appear patent. The pancreas is suboptimally visualized. The proximal abdominal aorta and inferior vena cava appear unremarkable. The gallbladder demonstrates sludge. No shadowing gallstones are seen. Gallbladder wall measures 2.2 mm. No sonographic Almonte's sign was elicited. Common bile duct measures 4.5 mm. No evidence for hydronephrosis in the right kidney. Right kidney measures 9.8 cm in length.     Impression: 1.  No hepatic mass identified. Gallbladder sludge without gallbladder wall thickening. Electronically signed by:  Abelardo Thorne DO  9/11/2022 3:08 AM CDT Workstation: [id number]                                         [facility]    Final diagnoses:   Nausea/vomiting in pregnancy   LFTs abnormal       ED Disposition  ED Disposition     ED Disposition   Discharge    Condition   Good    Comment   --             Daysi Abbasi MD  [address] [place], [place] [id number] [phone]    In 2 days           Medication List      New Prescriptions    doxylamine-pyridoxine 10-10 MG tablet delayed-release EC tablet  Commonly known as: DICLEGIS  Take 2 tablets by mouth At Night As Needed (nausea vomiting).           Where to Get Your Medications      These medications were sent to [id number] - [place], [place] - [address] [facility] AT [facility] [address] - [phone]  - [phone] FX  [address] [facility] [address]    Phone: [phone]   · doxylamine-pyridoxine 10-10 MG tablet delayed-release EC tablet          Ernesto Phelps MD  09/11/22 4444

## 2022-09-21 ENCOUNTER — INITIAL PRENATAL (OUTPATIENT)
Dept: OBSTETRICS AND GYNECOLOGY | Facility: CLINIC | Age: 22
End: 2022-09-21

## 2022-09-21 VITALS — SYSTOLIC BLOOD PRESSURE: 120 MMHG | BODY MASS INDEX: 36.21 KG/M2 | WEIGHT: 198 LBS | DIASTOLIC BLOOD PRESSURE: 72 MMHG

## 2022-09-21 DIAGNOSIS — N96 HISTORY OF RECURRENT MISCARRIAGES: ICD-10-CM

## 2022-09-21 DIAGNOSIS — R79.89 ELEVATED LIVER FUNCTION TESTS: ICD-10-CM

## 2022-09-21 DIAGNOSIS — Z78.9 ELECTRONIC CIGARETTE USE: ICD-10-CM

## 2022-09-21 DIAGNOSIS — O99.211 MATERNAL OBESITY WITHOUT HYPERTENSION, IN FIRST TRIMESTER: ICD-10-CM

## 2022-09-21 DIAGNOSIS — Z3A.10 10 WEEKS GESTATION OF PREGNANCY: Primary | ICD-10-CM

## 2022-09-21 DIAGNOSIS — Z67.91 RH NEGATIVE STATUS DURING PREGNANCY IN FIRST TRIMESTER: ICD-10-CM

## 2022-09-21 DIAGNOSIS — O26.891 RH NEGATIVE STATUS DURING PREGNANCY IN FIRST TRIMESTER: ICD-10-CM

## 2022-09-21 PROCEDURE — 0501F PRENATAL FLOW SHEET: CPT | Performed by: NURSE PRACTITIONER

## 2022-09-21 NOTE — PROGRESS NOTES
[facility]  Obstetrics  Date of Service: 2022    CHIEF COMPLAINT:  New prenatal visit    HISTORY OF PRESENT ILLNESS:  Shirley Sutton is a 22 y.o. y/o  at 10w5d by LMP (No LMP recorded (lmp unknown). Patient is pregnant.  This was a planned pregnancy and the patient is supported by significant other.  Declines nausea with vomiting, she reports its resolved when she stopped taking PNV.  She denies any vaginal bleeding.      REVIEW OF SYSTEMS  Review of Systems   Constitutional: Negative for activity change, appetite change, diaphoresis, fatigue, unexpected weight gain and unexpected weight loss.   Respiratory: Negative for chest tightness and shortness of breath.    Cardiovascular: Negative for chest pain and palpitations.   Gastrointestinal: Negative for abdominal distention, abdominal pain, constipation and diarrhea.   Genitourinary: Negative for amenorrhea, breast discharge, breast lump, breast pain, decreased libido, decreased urine volume, difficulty urinating, dyspareunia, dysuria, flank pain, frequency, genital sores, hematuria, pelvic pain, pelvic pressure, urgency, urinary incontinence, vaginal bleeding, vaginal discharge and vaginal pain.   Musculoskeletal: Negative for myalgias.   Skin: Negative for color change, dry skin and skin lesions.   Neurological: Negative for light-headedness and headache.   Psychiatric/Behavioral: Negative for agitation, dysphoric mood, sleep disturbance, depressed mood and stress. The patient is not nervous/anxious.        PRENATAL RISK FACTORS   Problems (from 24 to present)     No problems associated with this episode.          DATING CRITERIA:  LMP unknown  1TUS (2022 at 10w5d) -- ORQUIDEA 2023    OBSTETRIC HISTORY:  OB History    Para Term  AB Living   5       4     SAB IAB Ectopic Molar Multiple Live Births   4                # Outcome Date GA Lbr Alejandro/2nd Weight Sex Delivery Anes PTL Lv   5  Current            4 2022              Birth Comments: pt says she has had 4 miscarriages; she was only seen by a doctor for 1 miscarriage   3 2021           2 2020           1 2020              Birth Comments: saw a doctor in [place]     GYN HISTORY:    Last pap smear:   Last Completed Pap Smear     This patient has no relevant Health Maintenance data.        Denies h/o gynecologic surgeries, including biopsies of the cervix    PAST MEDICAL HISTORY:  Past Medical History:   Diagnosis Date   • Asthma    • Chlamydia    • Depression    • Recurrent pregnancy loss, antepartum condition or complication      PAST SURGICAL HISTORY:  Past Surgical History:   Procedure Laterality Date   • ADENOIDECTOMY     • TONSILLECTOMY       FAMILY HISTORY:  Family History   Problem Relation Age of Onset   • No Known Problems Father    • No Known Problems Mother    • No Known Problems Brother    • No Known Problems Brother    • No Known Problems Brother    • Heart attack Paternal Grandfather    • Hypertension Maternal Grandmother    • Cancer Maternal Grandfather      SOCIAL HISTORY:  Social History     Socioeconomic History   • Marital status: Single   Tobacco Use   • Smoking status: Former Smoker     Packs/day: 1.00     Types: Cigarettes     Quit date: 2022     Years since quittin.0   • Smokeless tobacco: Never Used   Vaping Use   • Vaping Use: Never used   Substance and Sexual Activity   • Alcohol use: Not Currently   • Drug use: Never   • Sexual activity: Yes     Partners: Male     GENETIC SCREENING:  Age >34 yo as of ORQUIDEA: no  Thalassemia: no  NTD: no  CHD: no  Down Syndrome/MR/Fragile X/Autism: no  Ashkenazi Gnosticist with Kai-Sachs, [place], familial dysautonomia: no  Sickle cell disease or trait: no  Hemophilia: no  Muscular dystrophy: no  Cystic fibrosis: no  Yen's chorea: no  Birth defects: no  Genetic/chromosomal disorders: no    INFECTION HISTORY:  TB exposure: no  HSV: no  Illness since LMP:  no  Prior GBS infected child: no  STIs: yes, treated in the past     ALLERGIES:  No Known Allergies    MEDICATIONS:  Prior to Admission medications    Medication Sig Start Date End Date Taking? Authorizing Provider   doxylamine-pyridoxine (DICLEGIS) 10-10 MG tablet delayed-release EC tablet Take 2 tablets by mouth At Night As Needed (nausea vomiting). 22   Ernesto Phelps MD   prenatal vitamin (prenatal, CLASSIC, vitamin) tablet Take  by mouth Daily.  22  ProviderLaurel MD       PHYSICAL EXAM:   /72   Wt 89.8 kg (198 lb)   LMP  (LMP Unknown)   BMI 36.21 kg/m²   General: Alert, healthy, no distress, well nourished and well developed.  Neurologic: Alert, oriented to person, place, and time.  Gait normal.  Cranial nerves II-XII grossly intact.  HEENT: Normocephalic, atraumatic.  Extraocular muscles intact, pupils equal and reactive x2.    Teeth: Normal hygiene.  Neck: Supple, no adenopathy, thyroid normal size, non-tender, without nodularity, trachea midline.  Lungs: Normal respiratory effort.  Clear to auscultation bilaterally.  No wheezes, rhonci, or rales.  Heart: Regular rate and rhythm.  No murmer, rub or gallop.  Abdomen: Soft, non-tender, non-distended,no masses, no hepatosplenomegaly, no hernia.  Skin: No rash, no lesions.  Extremities: No cyanosis, clubbing or edema.    Preliminary TVUS dating scan- single IUP with CRL 10w5d,  bpm, gestational and yolk sac seen, bilateral ovaries wnl    IMPRESSION:  Shirley Sutton is a 22 y.o.  at 10w5d for a new prenatal visit.    PLAN:  1.  IUP at 10w5d  - Prenatal labs reviewed, elevated ALT/AST, need early 1 hour ogtt- pt will have done in next 1-2 weeks  - Genetic testing, including cystic fibrosis- patient declined previously   - Restart prenatal vitamins at bedtime  - Weight gain counseling performed.   - Pregravid BMI >30: Recommend 11-20 lb  - Return to clinic in 4 weeks for return prenatal visit  - Reviewed COVID-19  visitation policy  - Reviewed COVID-19 precautions     Diagnosis Plan   1. 10 weeks gestation of pregnancy  Ambulatory Referral to Gastroenterology   2. History of recurrent miscarriages     3. Maternal obesity without hypertension, in first trimester     4. Elevated liver function tests  Ambulatory Referral to Gastroenterology   5. Rh negative status during pregnancy in first trimester     6. Electronic cigarette use         Yasmin Valentine, JAYNE  9/21/2022  10:09 T

## 2022-09-26 ENCOUNTER — OFFICE VISIT (OUTPATIENT)
Dept: GASTROENTEROLOGY | Facility: CLINIC | Age: 22
End: 2022-09-26

## 2022-09-26 ENCOUNTER — LAB (OUTPATIENT)
Dept: LAB | Facility: HOSPITAL | Age: 22
End: 2022-09-26

## 2022-09-26 VITALS
WEIGHT: 202 LBS | DIASTOLIC BLOOD PRESSURE: 64 MMHG | BODY MASS INDEX: 37.17 KG/M2 | SYSTOLIC BLOOD PRESSURE: 114 MMHG | HEIGHT: 62 IN | HEART RATE: 71 BPM

## 2022-09-26 DIAGNOSIS — Z3A.11 11 WEEKS GESTATION OF PREGNANCY: ICD-10-CM

## 2022-09-26 DIAGNOSIS — R79.89 ELEVATED LIVER FUNCTION TESTS: Primary | ICD-10-CM

## 2022-09-26 LAB
ALBUMIN SERPL-MCNC: 3.9 G/DL (ref 3.5–5.2)
ALP SERPL-CCNC: 76 U/L (ref 39–117)
ALT SERPL W P-5'-P-CCNC: 19 U/L (ref 1–33)
AST SERPL-CCNC: 11 U/L (ref 1–32)
BILIRUB CONJ SERPL-MCNC: <0.2 MG/DL (ref 0–0.3)
BILIRUB INDIRECT SERPL-MCNC: NORMAL MG/DL
BILIRUB SERPL-MCNC: <0.2 MG/DL (ref 0–1.2)
PROT SERPL-MCNC: 6.2 G/DL (ref 6–8.5)

## 2022-09-26 PROCEDURE — 80076 HEPATIC FUNCTION PANEL: CPT | Performed by: NURSE PRACTITIONER

## 2022-09-26 PROCEDURE — 36415 COLL VENOUS BLD VENIPUNCTURE: CPT | Performed by: NURSE PRACTITIONER

## 2022-09-26 PROCEDURE — 99213 OFFICE O/P EST LOW 20 MIN: CPT | Performed by: NURSE PRACTITIONER

## 2022-09-26 NOTE — PROGRESS NOTES
Chief Complaint   Patient presents with   • Elevated Hepatic Enzymes       Subjective    Shirley Sutton is a 22 y.o. female. she is here today for follow-up.    History of Present Illness    22-year-old female presents to discuss elevated liver enzymes.  She is 11 weeks gestation states she has occasional abdominal cramping denies any current abdominal pain.  Reports she was having constant intractable nausea vomiting when she sought emergency medical attention given IV fluids and symptomatic support and symptoms improved.  Transaminases were elevated acute hepatitis panel was negative/nonreactive.  Ultrasound noted coarsened echotexture of liver gallbladder sludge otherwise normal exam.  She denies any change in bowel movements or blood in her stool.  Denies any itching.  Alkaline phos albumin were within normal limits.  She denies any significant known GI family history.  Does report history of alcoholism in relatives.  She reports prior to pregnancy she would drink alcohol occasionally but denies any alcohol consumption in several months.  Ultrasound noted gallbladder sludge without any stones liver appeared normal        The following portions of the patient's history were reviewed and updated as appropriate:   Past Medical History:   Diagnosis Date   • Asthma    • Chlamydia    • Depression    • Recurrent pregnancy loss, antepartum condition or complication      Past Surgical History:   Procedure Laterality Date   • ADENOIDECTOMY     • TONSILLECTOMY       Family History   Problem Relation Age of Onset   • No Known Problems Father    • No Known Problems Mother    • No Known Problems Brother    • No Known Problems Brother    • No Known Problems Brother    • Heart attack Paternal Grandfather    • Hypertension Maternal Grandmother    • Cancer Maternal Grandfather      OB History        5    Para        Term                AB   4    Living           SAB   4    IAB        Ectopic        Molar      "   Multiple        Live Births                  Prior to Admission medications    Medication Sig Start Date End Date Taking? Authorizing Provider   doxylamine-pyridoxine (DICLEGIS) 10-10 MG tablet delayed-release EC tablet Take 2 tablets by mouth At Night As Needed (nausea vomiting). 22   Ernesto Phelps MD     No Known Allergies  Social History     Socioeconomic History   • Marital status: Single   Tobacco Use   • Smoking status: Former Smoker     Packs/day: 1.00     Types: Cigarettes     Quit date: 2022     Years since quittin.0   • Smokeless tobacco: Never Used   Vaping Use   • Vaping Use: Never used   Substance and Sexual Activity   • Alcohol use: Not Currently   • Drug use: Never   • Sexual activity: Yes     Partners: Male       Review of Systems  Review of Systems   Constitutional: Positive for fatigue. Negative for activity change, appetite change, chills, diaphoresis, fever and unexpected weight change.   HENT: Negative for sore throat and trouble swallowing.    Respiratory: Negative for shortness of breath.    Gastrointestinal: Positive for abdominal pain (more at night ), nausea and vomiting. Negative for abdominal distention, anal bleeding, blood in stool, constipation, diarrhea and rectal pain.        Chronic acid reflux never taken any medication    Musculoskeletal: Negative for arthralgias.   Skin: Negative for pallor.   Neurological: Negative for light-headedness.        /64 (BP Location: Left arm)   Pulse 71   Ht 157.5 cm (62\")   Wt 91.6 kg (202 lb)   LMP  (LMP Unknown)   BMI 36.95 kg/m²     Objective    Physical Exam  Constitutional:       General: She is not in acute distress.     Appearance: Normal appearance. She is normal weight. She is not ill-appearing.   HENT:      Head: Normocephalic and atraumatic.   Pulmonary:      Effort: Pulmonary effort is normal.   Abdominal:      General: Abdomen is flat. Bowel sounds are normal. There is no distension.      Palpations: Abdomen " is soft. There is no mass.      Tenderness: There is no abdominal tenderness.   Neurological:      Mental Status: She is alert.       Admission on 09/10/2022, Discharged on 09/11/2022   Component Date Value Ref Range Status   • Glucose 09/11/2022 84  65 - 99 mg/dL Final   • BUN 09/11/2022 5 (A) 6 - 20 mg/dL Final   • Creatinine 09/11/2022 0.63  0.57 - 1.00 mg/dL Final   • Sodium 09/11/2022 137  136 - 145 mmol/L Final   • Potassium 09/11/2022 3.6  3.5 - 5.2 mmol/L Final   • Chloride 09/11/2022 100  98 - 107 mmol/L Final   • CO2 09/11/2022 25.0  22.0 - 29.0 mmol/L Final   • Calcium 09/11/2022 10.2  8.6 - 10.5 mg/dL Final   • Total Protein 09/11/2022 6.7  6.0 - 8.5 g/dL Final   • Albumin 09/11/2022 4.10  3.50 - 5.20 g/dL Final   • ALT (SGPT) 09/11/2022 157 (A) 1 - 33 U/L Final   • AST (SGOT) 09/11/2022 153 (A) 1 - 32 U/L Final   • Alkaline Phosphatase 09/11/2022 75  39 - 117 U/L Final   • Total Bilirubin 09/11/2022 0.3  0.0 - 1.2 mg/dL Final   • Globulin 09/11/2022 2.6  gm/dL Final   • A/G Ratio 09/11/2022 1.6  g/dL Final   • BUN/Creatinine Ratio 09/11/2022 7.9  7.0 - 25.0 Final   • Anion Gap 09/11/2022 12.0  5.0 - 15.0 mmol/L Final   • eGFR 09/11/2022 130.4  >60.0 mL/min/1.73 Final    National Kidney Foundation and American Society of Nephrology (ASN) Task Force recommended calculation based on the Chronic Kidney Disease Epidemiology Collaboration (CKD-EPI) equation refit without adjustment for race.   • Lipase 09/11/2022 11 (A) 13 - 60 U/L Final   • Color, UA 09/11/2022 Dark Yellow  Yellow, Straw, Dark Yellow, Paulina Final   • Appearance, UA 09/11/2022 Cloudy (A) Clear Final   • pH, UA 09/11/2022 6.0  5.0 - 9.0 Final   • Specific Gravity, UA 09/11/2022 1.027  1.003 - 1.030 Final   • Glucose, UA 09/11/2022 Negative  Negative Final   • Ketones, UA 09/11/2022 Trace (A) Negative Final   • Bilirubin, UA 09/11/2022 Negative  Negative Final   • Blood, UA 09/11/2022 Negative  Negative Final   • Protein, UA 09/11/2022 Negative   Negative Final   • Leuk Esterase, UA 09/11/2022 Trace (A) Negative Final   • Nitrite, UA 09/11/2022 Negative  Negative Final   • Urobilinogen, UA 09/11/2022 1.0 E.U./dL  0.2 - 1.0 E.U./dL Final   • HCG Quantitative 09/11/2022 68,782.00  mIU/mL Final   • WBC 09/11/2022 11.57 (A) 3.40 - 10.80 10*3/mm3 Final   • RBC 09/11/2022 3.96  3.77 - 5.28 10*6/mm3 Final   • Hemoglobin 09/11/2022 12.1  12.0 - 15.9 g/dL Final   • Hematocrit 09/11/2022 35.6  34.0 - 46.6 % Final   • MCV 09/11/2022 89.9  79.0 - 97.0 fL Final   • MCH 09/11/2022 30.6  26.6 - 33.0 pg Final   • MCHC 09/11/2022 34.0  31.5 - 35.7 g/dL Final   • RDW 09/11/2022 13.4  12.3 - 15.4 % Final   • RDW-SD 09/11/2022 44.0  37.0 - 54.0 fl Final   • MPV 09/11/2022 9.0  6.0 - 12.0 fL Final   • Platelets 09/11/2022 316  140 - 450 10*3/mm3 Final   • Neutrophil % 09/11/2022 71.3  42.7 - 76.0 % Final   • Lymphocyte % 09/11/2022 19.5 (A) 19.6 - 45.3 % Final   • Monocyte % 09/11/2022 7.9  5.0 - 12.0 % Final   • Eosinophil % 09/11/2022 0.3  0.3 - 6.2 % Final   • Basophil % 09/11/2022 0.3  0.0 - 1.5 % Final   • Immature Grans % 09/11/2022 0.7 (A) 0.0 - 0.5 % Final   • Neutrophils, Absolute 09/11/2022 8.25 (A) 1.70 - 7.00 10*3/mm3 Final   • Lymphocytes, Absolute 09/11/2022 2.26  0.70 - 3.10 10*3/mm3 Final   • Monocytes, Absolute 09/11/2022 0.91 (A) 0.10 - 0.90 10*3/mm3 Final   • Eosinophils, Absolute 09/11/2022 0.03  0.00 - 0.40 10*3/mm3 Final   • Basophils, Absolute 09/11/2022 0.04  0.00 - 0.20 10*3/mm3 Final   • Immature Grans, Absolute 09/11/2022 0.08 (A) 0.00 - 0.05 10*3/mm3 Final   • nRBC 09/11/2022 0.0  0.0 - 0.2 /100 WBC Final   • RBC, UA 09/11/2022 3-5 (A) None Seen /HPF Final   • WBC, UA 09/11/2022 0-2  None Seen, 0-2, 3-5 /HPF Final   • Bacteria, UA 09/11/2022 Trace (A) None Seen /HPF Final   • Squamous Epithelial Cells, UA 09/11/2022 6-12 (A) None Seen, 0-2 /HPF Final   • Hyaline Casts, UA 09/11/2022 None Seen  None Seen /LPF Final   • Methodology 09/11/2022 Automated  Microscopy   Final   • Hepatitis B Surface Ag 09/11/2022 Non-Reactive  Non-Reactive Final   • Hep A IgM 09/11/2022 Non-Reactive  Non-Reactive Final   • Hep B C IgM 09/11/2022 Non-Reactive  Non-Reactive Final   • Hepatitis C Ab 09/11/2022 Non-Reactive  Non-Reactive Final     Assessment & Plan      1. Elevated liver function tests    2. 11 weeks gestation of pregnancy    .   Suspect elevated liver enzymes related to hyperemesis syndrome.  We will recheck enzymes today further elevation will plan further work-up.  Continue with reflux medication per gynecologist.  Follow-up in GI office as needed    Orders placed during this encounter include:  Orders Placed This Encounter   Procedures   • Hepatic Function Panel     Order Specific Question:   Release to patient     Answer:   Routine Release       * Surgery not found *    Review and/or summary of lab tests, radiology, procedures, medications. Review and summary of old records and obtaining of history. The risks and benefits of my recommendations, as well as other treatment options were discussed with the patient today. Questions were answered.    No orders of the defined types were placed in this encounter.      Follow-up: Return in about 4 weeks (around 10/24/2022) for Recheck, After test.          This document has been electronically signed by JAYNE Bullard on September 26, 2022 09:30 CDT        I spent 22 minutes caring for Shirley on this date of service. This time includes time spent by me in the following activities:preparing for the visit, reviewing tests, obtaining and/or reviewing a separately obtained history, performing a medically appropriate examination and/or evaluation , counseling and educating the patient/family/caregiver, ordering medications, tests, or procedures, referring and communicating with other health care professionals , documenting information in the medical record and care coordination    Results for orders placed or performed during  the hospital encounter of 09/10/22   Urinalysis, Microscopic Only - Urine, Clean Catch    Specimen: Urine, Clean Catch   Result Value Ref Range    RBC, UA 3-5 (A) None Seen /HPF    WBC, UA 0-2 None Seen, 0-2, 3-5 /HPF    Bacteria, UA Trace (A) None Seen /HPF    Squamous Epithelial Cells, UA 6-12 (A) None Seen, 0-2 /HPF    Hyaline Casts, UA None Seen None Seen /LPF    Methodology Automated Microscopy    Urinalysis With Microscopic If Indicated (No Culture) - Urine, Clean Catch    Specimen: Urine, Clean Catch   Result Value Ref Range    Color, UA Dark Yellow Yellow, Straw, Dark Yellow, Paulina    Appearance, UA Cloudy (A) Clear    pH, UA 6.0 5.0 - 9.0    Specific Gravity, UA 1.027 1.003 - 1.030    Glucose, UA Negative Negative    Ketones, UA Trace (A) Negative    Bilirubin, UA Negative Negative    Blood, UA Negative Negative    Protein, UA Negative Negative    Leuk Esterase, UA Trace (A) Negative    Nitrite, UA Negative Negative    Urobilinogen, UA 1.0 E.U./dL 0.2 - 1.0 E.U./dL   CBC Auto Differential    Specimen: Blood   Result Value Ref Range    WBC 11.57 (H) 3.40 - 10.80 10*3/mm3    RBC 3.96 3.77 - 5.28 10*6/mm3    Hemoglobin 12.1 12.0 - 15.9 g/dL    Hematocrit 35.6 34.0 - 46.6 %    MCV 89.9 79.0 - 97.0 fL    MCH 30.6 26.6 - 33.0 pg    MCHC 34.0 31.5 - 35.7 g/dL    RDW 13.4 12.3 - 15.4 %    RDW-SD 44.0 37.0 - 54.0 fl    MPV 9.0 6.0 - 12.0 fL    Platelets 316 140 - 450 10*3/mm3    Neutrophil % 71.3 42.7 - 76.0 %    Lymphocyte % 19.5 (L) 19.6 - 45.3 %    Monocyte % 7.9 5.0 - 12.0 %    Eosinophil % 0.3 0.3 - 6.2 %    Basophil % 0.3 0.0 - 1.5 %    Immature Grans % 0.7 (H) 0.0 - 0.5 %    Neutrophils, Absolute 8.25 (H) 1.70 - 7.00 10*3/mm3    Lymphocytes, Absolute 2.26 0.70 - 3.10 10*3/mm3    Monocytes, Absolute 0.91 (H) 0.10 - 0.90 10*3/mm3    Eosinophils, Absolute 0.03 0.00 - 0.40 10*3/mm3    Basophils, Absolute 0.04 0.00 - 0.20 10*3/mm3    Immature Grans, Absolute 0.08 (H) 0.00 - 0.05 10*3/mm3    nRBC 0.0 0.0 - 0.2 /100  WBC   Hepatitis Panel, Acute    Specimen: Blood   Result Value Ref Range    Hepatitis B Surface Ag Non-Reactive Non-Reactive    Hep A IgM Non-Reactive Non-Reactive    Hep B C IgM Non-Reactive Non-Reactive    Hepatitis C Ab Non-Reactive Non-Reactive   hCG, Quantitative, Pregnancy    Specimen: Blood   Result Value Ref Range    HCG Quantitative 68,782.00 mIU/mL   Lipase    Specimen: Blood   Result Value Ref Range    Lipase 11 (L) 13 - 60 U/L   Comprehensive Metabolic Panel    Specimen: Blood   Result Value Ref Range    Glucose 84 65 - 99 mg/dL    BUN 5 (L) 6 - 20 mg/dL    Creatinine 0.63 0.57 - 1.00 mg/dL    Sodium 137 136 - 145 mmol/L    Potassium 3.6 3.5 - 5.2 mmol/L    Chloride 100 98 - 107 mmol/L    CO2 25.0 22.0 - 29.0 mmol/L    Calcium 10.2 8.6 - 10.5 mg/dL    Total Protein 6.7 6.0 - 8.5 g/dL    Albumin 4.10 3.50 - 5.20 g/dL    ALT (SGPT) 157 (H) 1 - 33 U/L    AST (SGOT) 153 (H) 1 - 32 U/L    Alkaline Phosphatase 75 39 - 117 U/L    Total Bilirubin 0.3 0.0 - 1.2 mg/dL    Globulin 2.6 gm/dL    A/G Ratio 1.6 g/dL    BUN/Creatinine Ratio 7.9 7.0 - 25.0    Anion Gap 12.0 5.0 - 15.0 mmol/L    eGFR 130.4 >60.0 mL/min/1.73   Results for orders placed or performed in visit on 09/06/22   PREVIOUS HISTORY    Specimen: Blood   Result Value Ref Range    Previous History Previous Record on File    OB Panel Type & Screen    Specimen: Blood   Result Value Ref Range    ABO Type O     RH type Negative     Antibody Screen Negative    HIV-1 / O / 2 Ag / Antibody 4th Generation    Specimen: Blood   Result Value Ref Range    HIV-1/ HIV-2 Non-Reactive Non-Reactive   Chlamydia trachomatis, Neisseria gonorrhoeae, Trichomonas vaginalis, PCR - Urine, Urine, Clean Catch    Specimen: Urine, Clean Catch   Result Value Ref Range    Chlamydia DNA by PCR Negative Negative    Neisseria gonorrhoeae by PCR Negative Negative    Trichomonas vaginalis PCR Negative Negative, Invalid   CBC Auto Differential    Specimen: Blood   Result Value Ref Range     WBC 10.84 (H) 3.40 - 10.80 10*3/mm3    RBC 4.34 3.77 - 5.28 10*6/mm3    Hemoglobin 13.1 12.0 - 15.9 g/dL    Hematocrit 39.4 34.0 - 46.6 %    MCV 90.8 79.0 - 97.0 fL    MCH 30.2 26.6 - 33.0 pg    MCHC 33.2 31.5 - 35.7 g/dL    RDW 13.2 12.3 - 15.4 %    RDW-SD 43.8 37.0 - 54.0 fl    MPV 9.3 6.0 - 12.0 fL    Platelets 337 140 - 450 10*3/mm3    Neutrophil % 77.6 (H) 42.7 - 76.0 %    Lymphocyte % 13.7 (L) 19.6 - 45.3 %    Monocyte % 7.0 5.0 - 12.0 %    Eosinophil % 1.1 0.3 - 6.2 %    Basophil % 0.3 0.0 - 1.5 %    Immature Grans % 0.3 0.0 - 0.5 %    Neutrophils, Absolute 8.42 (H) 1.70 - 7.00 10*3/mm3    Lymphocytes, Absolute 1.48 0.70 - 3.10 10*3/mm3    Monocytes, Absolute 0.76 0.10 - 0.90 10*3/mm3    Eosinophils, Absolute 0.12 0.00 - 0.40 10*3/mm3    Basophils, Absolute 0.03 0.00 - 0.20 10*3/mm3    Immature Grans, Absolute 0.03 0.00 - 0.05 10*3/mm3    nRBC 0.0 0.0 - 0.2 /100 WBC   Hepatitis C Antibody    Specimen: Blood   Result Value Ref Range    Hepatitis C Ab Non-Reactive Non-Reactive     *Note: Due to a large number of results and/or encounters for the requested time period, some results have not been displayed. A complete set of results can be found in Results Review.

## 2022-09-27 ENCOUNTER — LAB (OUTPATIENT)
Dept: LAB | Facility: HOSPITAL | Age: 22
End: 2022-09-27

## 2022-09-27 DIAGNOSIS — O99.211 MATERNAL OBESITY WITHOUT HYPERTENSION, IN FIRST TRIMESTER: ICD-10-CM

## 2022-09-27 DIAGNOSIS — O99.211 MATERNAL OBESITY WITHOUT HYPERTENSION, IN FIRST TRIMESTER: Primary | ICD-10-CM

## 2022-09-27 LAB — GLUCOSE 1H P 100 G GLC PO SERPL-MCNC: 135 MG/DL (ref 65–139)

## 2022-09-27 PROCEDURE — 82950 GLUCOSE TEST: CPT

## 2022-09-30 ENCOUNTER — HOSPITAL ENCOUNTER (EMERGENCY)
Facility: HOSPITAL | Age: 22
Discharge: HOME | End: 2022-09-30
Attending: EMERGENCY MEDICINE | Admitting: EMERGENCY MEDICINE

## 2022-09-30 ENCOUNTER — APPOINTMENT (OUTPATIENT)
Dept: ULTRASOUND IMAGING | Facility: HOSPITAL | Age: 22
End: 2022-09-30

## 2022-09-30 VITALS
BODY MASS INDEX: 37.17 KG/M2 | OXYGEN SATURATION: 99 % | WEIGHT: 202 LBS | HEIGHT: 62 IN | TEMPERATURE: 97.6 F | DIASTOLIC BLOOD PRESSURE: 56 MMHG | HEART RATE: 72 BPM | RESPIRATION RATE: 16 BRPM | SYSTOLIC BLOOD PRESSURE: 105 MMHG

## 2022-09-30 DIAGNOSIS — O20.0 THREATENED MISCARRIAGE IN EARLY PREGNANCY: Primary | ICD-10-CM

## 2022-09-30 DIAGNOSIS — R82.71 BACTERIURIA IN PREGNANCY: ICD-10-CM

## 2022-09-30 DIAGNOSIS — O99.891 BACTERIURIA IN PREGNANCY: ICD-10-CM

## 2022-09-30 LAB
ABO GROUP BLD: NORMAL
ALBUMIN SERPL-MCNC: 3.8 G/DL (ref 3.5–5.2)
ALBUMIN/GLOB SERPL: 1.2 G/DL
ALP SERPL-CCNC: 81 U/L (ref 39–117)
ALT SERPL W P-5'-P-CCNC: 17 U/L (ref 1–33)
ANION GAP SERPL CALCULATED.3IONS-SCNC: 10 MMOL/L (ref 5–15)
AST SERPL-CCNC: 13 U/L (ref 1–32)
BACTERIA UR QL AUTO: ABNORMAL /HPF
BASOPHILS # BLD AUTO: 0.07 10*3/MM3 (ref 0–0.2)
BASOPHILS NFR BLD AUTO: 0.6 % (ref 0–1.5)
BILIRUB SERPL-MCNC: 0.2 MG/DL (ref 0–1.2)
BILIRUB UR QL STRIP: NEGATIVE
BUN SERPL-MCNC: 5 MG/DL (ref 6–20)
BUN/CREAT SERPL: 8.3 (ref 7–25)
CALCIUM SPEC-SCNC: 8.9 MG/DL (ref 8.6–10.5)
CHLORIDE SERPL-SCNC: 105 MMOL/L (ref 98–107)
CLARITY UR: ABNORMAL
CO2 SERPL-SCNC: 23 MMOL/L (ref 22–29)
COD CRY URNS QL: ABNORMAL /HPF
COLOR UR: YELLOW
CREAT SERPL-MCNC: 0.6 MG/DL (ref 0.57–1)
DEPRECATED RDW RBC AUTO: 45.5 FL (ref 37–54)
EGFRCR SERPLBLD CKD-EPI 2021: 132 ML/MIN/1.73
EOSINOPHIL # BLD AUTO: 0.17 10*3/MM3 (ref 0–0.4)
EOSINOPHIL NFR BLD AUTO: 1.4 % (ref 0.3–6.2)
ERYTHROCYTE [DISTWIDTH] IN BLOOD BY AUTOMATED COUNT: 13.7 % (ref 12.3–15.4)
GLOBULIN UR ELPH-MCNC: 3.1 GM/DL
GLUCOSE SERPL-MCNC: 91 MG/DL (ref 65–99)
GLUCOSE UR STRIP-MCNC: NEGATIVE MG/DL
HCG INTACT+B SERPL-ACNC: NORMAL MIU/ML
HCT VFR BLD AUTO: 37 % (ref 34–46.6)
HGB BLD-MCNC: 12 G/DL (ref 12–15.9)
HGB UR QL STRIP.AUTO: NEGATIVE
HYALINE CASTS UR QL AUTO: ABNORMAL /LPF
IMM GRANULOCYTES # BLD AUTO: 0.06 10*3/MM3 (ref 0–0.05)
IMM GRANULOCYTES NFR BLD AUTO: 0.5 % (ref 0–0.5)
KETONES UR QL STRIP: NEGATIVE
LEUKOCYTE ESTERASE UR QL STRIP.AUTO: ABNORMAL
LYMPHOCYTES # BLD AUTO: 1.77 10*3/MM3 (ref 0.7–3.1)
LYMPHOCYTES NFR BLD AUTO: 14.4 % (ref 19.6–45.3)
MCH RBC QN AUTO: 29.4 PG (ref 26.6–33)
MCHC RBC AUTO-ENTMCNC: 32.4 G/DL (ref 31.5–35.7)
MCV RBC AUTO: 90.7 FL (ref 79–97)
MONOCYTES # BLD AUTO: 1.03 10*3/MM3 (ref 0.1–0.9)
MONOCYTES NFR BLD AUTO: 8.4 % (ref 5–12)
NEUTROPHILS NFR BLD AUTO: 74.7 % (ref 42.7–76)
NEUTROPHILS NFR BLD AUTO: 9.19 10*3/MM3 (ref 1.7–7)
NITRITE UR QL STRIP: NEGATIVE
NRBC BLD AUTO-RTO: 0 /100 WBC (ref 0–0.2)
PH UR STRIP.AUTO: 6 [PH] (ref 5–9)
PLATELET # BLD AUTO: 323 10*3/MM3 (ref 140–450)
PMV BLD AUTO: 8.9 FL (ref 6–12)
POTASSIUM SERPL-SCNC: 3.9 MMOL/L (ref 3.5–5.2)
PROT SERPL-MCNC: 6.9 G/DL (ref 6–8.5)
PROT UR QL STRIP: NEGATIVE
RBC # BLD AUTO: 4.08 10*6/MM3 (ref 3.77–5.28)
RBC # UR STRIP: ABNORMAL /HPF
REF LAB TEST METHOD: ABNORMAL
RH BLD: NEGATIVE
SODIUM SERPL-SCNC: 138 MMOL/L (ref 136–145)
SP GR UR STRIP: 1.02 (ref 1–1.03)
SQUAMOUS #/AREA URNS HPF: ABNORMAL /HPF
UROBILINOGEN UR QL STRIP: ABNORMAL
WBC # UR STRIP: ABNORMAL /HPF
WBC NRBC COR # BLD: 12.29 10*3/MM3 (ref 3.4–10.8)
WHOLE BLOOD HOLD COAG: NORMAL

## 2022-09-30 PROCEDURE — 96372 THER/PROPH/DIAG INJ SC/IM: CPT

## 2022-09-30 PROCEDURE — 80053 COMPREHEN METABOLIC PANEL: CPT | Performed by: EMERGENCY MEDICINE

## 2022-09-30 PROCEDURE — 76801 OB US < 14 WKS SINGLE FETUS: CPT

## 2022-09-30 PROCEDURE — 84702 CHORIONIC GONADOTROPIN TEST: CPT | Performed by: EMERGENCY MEDICINE

## 2022-09-30 PROCEDURE — 25010000002 RHO D IMMUNE GLOBULIN 1500 UNITS SOLUTION PREFILLED SYRINGE: Performed by: EMERGENCY MEDICINE

## 2022-09-30 PROCEDURE — 85025 COMPLETE CBC W/AUTO DIFF WBC: CPT | Performed by: EMERGENCY MEDICINE

## 2022-09-30 PROCEDURE — 86901 BLOOD TYPING SEROLOGIC RH(D): CPT | Performed by: EMERGENCY MEDICINE

## 2022-09-30 PROCEDURE — 81001 URINALYSIS AUTO W/SCOPE: CPT | Performed by: EMERGENCY MEDICINE

## 2022-09-30 PROCEDURE — 86900 BLOOD TYPING SEROLOGIC ABO: CPT | Performed by: EMERGENCY MEDICINE

## 2022-09-30 PROCEDURE — 99283 EMERGENCY DEPT VISIT LOW MDM: CPT

## 2022-09-30 RX ORDER — PRENATAL VIT NO.126/IRON/FOLIC 28MG-0.8MG
TABLET ORAL DAILY
COMMUNITY

## 2022-09-30 RX ORDER — CEPHALEXIN 500 MG/1
500 CAPSULE ORAL 2 TIMES DAILY
Qty: 14 CAPSULE | Refills: 0 | OUTPATIENT
Start: 2022-09-30 | End: 2022-10-28

## 2022-09-30 RX ADMIN — RHO(D) IMMUNE GLOBULIN (HUMAN) 49.5 MCG: 1500 SOLUTION INTRAMUSCULAR at 17:49

## 2022-10-06 ENCOUNTER — TELEPHONE (OUTPATIENT)
Dept: OBSTETRICS AND GYNECOLOGY | Facility: CLINIC | Age: 22
End: 2022-10-06

## 2022-10-06 DIAGNOSIS — N89.8 VAGINAL ITCHING: Primary | ICD-10-CM

## 2022-10-06 NOTE — TELEPHONE ENCOUNTER
Called pt to follow up on recent ED visit.  She reports vaginal spotting has resolved.  Currently she is on cephalexin for UTI.  Since starting antibiotic she has noticed increase in vaginal discharge.  Vaginal antifungal cream sent to her pharmacy.  Keep next appt or f/u sooner if needed.  Pt v/u and agrees to plan.

## 2022-10-19 ENCOUNTER — ROUTINE PRENATAL (OUTPATIENT)
Dept: OBSTETRICS AND GYNECOLOGY | Facility: CLINIC | Age: 22
End: 2022-10-19

## 2022-10-19 VITALS — DIASTOLIC BLOOD PRESSURE: 58 MMHG | WEIGHT: 197.8 LBS | BODY MASS INDEX: 36.18 KG/M2 | SYSTOLIC BLOOD PRESSURE: 102 MMHG

## 2022-10-19 DIAGNOSIS — N96 HISTORY OF RECURRENT MISCARRIAGES: ICD-10-CM

## 2022-10-19 DIAGNOSIS — Z36.3 ANTENATAL SCREENING FOR MALFORMATION USING ULTRASONICS: ICD-10-CM

## 2022-10-19 DIAGNOSIS — O09.42 SUPERVISION OF HIGH-RISK PREGNANCY WITH GRAND MULTIPARITY IN SECOND TRIMESTER: Primary | ICD-10-CM

## 2022-10-19 DIAGNOSIS — Z67.91 RH NEGATIVE STATUS DURING PREGNANCY IN FIRST TRIMESTER: ICD-10-CM

## 2022-10-19 DIAGNOSIS — R79.89 ELEVATED LIVER FUNCTION TESTS: ICD-10-CM

## 2022-10-19 DIAGNOSIS — Z78.9 ELECTRONIC CIGARETTE USE: ICD-10-CM

## 2022-10-19 DIAGNOSIS — O26.891 RH NEGATIVE STATUS DURING PREGNANCY IN FIRST TRIMESTER: ICD-10-CM

## 2022-10-19 DIAGNOSIS — O99.211 MATERNAL OBESITY WITHOUT HYPERTENSION, IN FIRST TRIMESTER: ICD-10-CM

## 2022-10-19 PROBLEM — O99.619 GASTROESOPHAGEAL REFLUX IN PREGNANCY: Status: ACTIVE | Noted: 2022-10-19

## 2022-10-19 PROBLEM — K21.9 GASTROESOPHAGEAL REFLUX IN PREGNANCY: Status: ACTIVE | Noted: 2022-10-19

## 2022-10-19 PROCEDURE — 0502F SUBSEQUENT PRENATAL CARE: CPT | Performed by: STUDENT IN AN ORGANIZED HEALTH CARE EDUCATION/TRAINING PROGRAM

## 2022-10-19 RX ORDER — FAMOTIDINE 20 MG/1
20 TABLET, FILM COATED ORAL DAILY
Qty: 30 TABLET | Refills: 1 | OUTPATIENT
Start: 2022-10-19 | End: 2022-10-28

## 2022-10-19 NOTE — PROGRESS NOTES
CC: Prenatal visit    Shirley Sutton is a 22 y.o.  at 14w5d.  Doing well.  Denies contractions, LOF, or VB.  Reports good FM.    Seen in ED . Vaginal spotting then. Started on keflex for UTI. Treated for vaginal yeast too.    Mild RLQ cramping. 1-2 times/day. No further bleeding. No further DC.     Dealing with some reflux. No meds. No constipation.    /58   Wt 89.7 kg (197 lb 12.8 oz)   LMP  (LMP Unknown)   BMI 36.18 kg/m²   SVE: deferred     Fetal Heart Rate: Vscan      A/P: Shirley Sutton is a 22 y.o.  at 14w5d.  - RTC in 4 weeks  - Anatomy US next visit  - Reviewed COVID-19 visitation policy  - Reviewed COVID-19 precautions    Problem List Items Addressed This Visit        Gastrointestinal Abdominal     Elevated liver function tests    Overview     Nml 22            Gravid and     History of recurrent miscarriages    Maternal obesity without hypertension, in first trimester    Overview     Borderline 1 hr , recommend dietary moderation and repeat 26 weeks         Rh negative status during pregnancy in first trimester    Supervision of high-risk pregnancy with grand multiparity in second trimester - Primary    Overview     Rh neg, Obesity            Tobacco    Electronic cigarette use          Diagnosis Plan   1. Supervision of high-risk pregnancy with grand multiparity in second trimester        2. Elevated liver function tests        3. History of recurrent miscarriages        4. Maternal obesity without hypertension, in first trimester        5. Rh negative status during pregnancy in first trimester        6. Electronic cigarette use          Corinne Perez DO  10/19/2022  08:56 CDT

## 2022-10-28 ENCOUNTER — HOSPITAL ENCOUNTER (EMERGENCY)
Facility: HOSPITAL | Age: 22
Discharge: HOME | End: 2022-10-28
Attending: FAMILY MEDICINE | Admitting: FAMILY MEDICINE

## 2022-10-28 VITALS
OXYGEN SATURATION: 100 % | RESPIRATION RATE: 16 BRPM | WEIGHT: 192 LBS | TEMPERATURE: 98.2 F | HEIGHT: 61 IN | DIASTOLIC BLOOD PRESSURE: 59 MMHG | SYSTOLIC BLOOD PRESSURE: 95 MMHG | BODY MASS INDEX: 36.25 KG/M2 | HEART RATE: 74 BPM

## 2022-10-28 DIAGNOSIS — O23.599 BACTERIAL VAGINOSIS IN PREGNANCY: ICD-10-CM

## 2022-10-28 DIAGNOSIS — B96.89 BACTERIAL VAGINOSIS IN PREGNANCY: ICD-10-CM

## 2022-10-28 DIAGNOSIS — N39.0 URINARY TRACT INFECTION WITHOUT HEMATURIA, SITE UNSPECIFIED: Primary | ICD-10-CM

## 2022-10-28 LAB
ALBUMIN SERPL-MCNC: 3.9 G/DL (ref 3.5–5.2)
ALBUMIN/GLOB SERPL: 1.3 G/DL
ALP SERPL-CCNC: 72 U/L (ref 39–117)
ALT SERPL W P-5'-P-CCNC: 23 U/L (ref 1–33)
ANION GAP SERPL CALCULATED.3IONS-SCNC: 12 MMOL/L (ref 5–15)
AST SERPL-CCNC: 15 U/L (ref 1–32)
BACTERIA UR QL AUTO: ABNORMAL /HPF
BASOPHILS # BLD AUTO: 0.06 10*3/MM3 (ref 0–0.2)
BASOPHILS NFR BLD AUTO: 0.5 % (ref 0–1.5)
BILIRUB SERPL-MCNC: 0.2 MG/DL (ref 0–1.2)
BILIRUB UR QL STRIP: NEGATIVE
BUN SERPL-MCNC: 6 MG/DL (ref 6–20)
BUN/CREAT SERPL: 9.8 (ref 7–25)
C TRACH RRNA CVX QL NAA+PROBE: NEGATIVE
CALCIUM SPEC-SCNC: 9.2 MG/DL (ref 8.6–10.5)
CANDIDA ALBICANS: NEGATIVE
CHLORIDE SERPL-SCNC: 104 MMOL/L (ref 98–107)
CLARITY UR: ABNORMAL
CO2 SERPL-SCNC: 21 MMOL/L (ref 22–29)
COLOR UR: ABNORMAL
CREAT SERPL-MCNC: 0.61 MG/DL (ref 0.57–1)
DEPRECATED RDW RBC AUTO: 44.3 FL (ref 37–54)
EGFRCR SERPLBLD CKD-EPI 2021: 131.4 ML/MIN/1.73
EOSINOPHIL # BLD AUTO: 0.31 10*3/MM3 (ref 0–0.4)
EOSINOPHIL NFR BLD AUTO: 2.6 % (ref 0.3–6.2)
ERYTHROCYTE [DISTWIDTH] IN BLOOD BY AUTOMATED COUNT: 13.5 % (ref 12.3–15.4)
GARDNERELLA VAGINALIS: POSITIVE
GLOBULIN UR ELPH-MCNC: 2.9 GM/DL
GLUCOSE SERPL-MCNC: 114 MG/DL (ref 65–99)
GLUCOSE UR STRIP-MCNC: NEGATIVE MG/DL
HCG INTACT+B SERPL-ACNC: NORMAL MIU/ML
HCT VFR BLD AUTO: 37.6 % (ref 34–46.6)
HGB BLD-MCNC: 12.5 G/DL (ref 12–15.9)
HGB UR QL STRIP.AUTO: ABNORMAL
HYALINE CASTS UR QL AUTO: ABNORMAL /LPF
IMM GRANULOCYTES # BLD AUTO: 0.04 10*3/MM3 (ref 0–0.05)
IMM GRANULOCYTES NFR BLD AUTO: 0.3 % (ref 0–0.5)
KETONES UR QL STRIP: ABNORMAL
LEUKOCYTE ESTERASE UR QL STRIP.AUTO: ABNORMAL
LYMPHOCYTES # BLD AUTO: 2.56 10*3/MM3 (ref 0.7–3.1)
LYMPHOCYTES NFR BLD AUTO: 21.8 % (ref 19.6–45.3)
MCH RBC QN AUTO: 29.9 PG (ref 26.6–33)
MCHC RBC AUTO-ENTMCNC: 33.2 G/DL (ref 31.5–35.7)
MCV RBC AUTO: 90 FL (ref 79–97)
MONOCYTES # BLD AUTO: 0.64 10*3/MM3 (ref 0.1–0.9)
MONOCYTES NFR BLD AUTO: 5.5 % (ref 5–12)
N GONORRHOEA RRNA SPEC QL NAA+PROBE: NEGATIVE
NEUTROPHILS NFR BLD AUTO: 69.3 % (ref 42.7–76)
NEUTROPHILS NFR BLD AUTO: 8.12 10*3/MM3 (ref 1.7–7)
NITRITE UR QL STRIP: NEGATIVE
NRBC BLD AUTO-RTO: 0 /100 WBC (ref 0–0.2)
PH UR STRIP.AUTO: 6.5 [PH] (ref 5–9)
PLATELET # BLD AUTO: 300 10*3/MM3 (ref 140–450)
PMV BLD AUTO: 9.3 FL (ref 6–12)
POTASSIUM SERPL-SCNC: 3.3 MMOL/L (ref 3.5–5.2)
PROT SERPL-MCNC: 6.8 G/DL (ref 6–8.5)
PROT UR QL STRIP: ABNORMAL
RBC # BLD AUTO: 4.18 10*6/MM3 (ref 3.77–5.28)
RBC # UR STRIP: ABNORMAL /HPF
REF LAB TEST METHOD: ABNORMAL
SODIUM SERPL-SCNC: 137 MMOL/L (ref 136–145)
SP GR UR STRIP: 1.03 (ref 1–1.03)
SQUAMOUS #/AREA URNS HPF: ABNORMAL /HPF
T VAGINALIS DNA VAG QL PROBE+SIG AMP: NEGATIVE
TRICHOMONAS VAGINALIS PCR: NEGATIVE
UROBILINOGEN UR QL STRIP: ABNORMAL
WBC # UR STRIP: ABNORMAL /HPF
WBC NRBC COR # BLD: 11.73 10*3/MM3 (ref 3.4–10.8)
WHOLE BLOOD HOLD COAG: NORMAL
WHOLE BLOOD HOLD SPECIMEN: NORMAL

## 2022-10-28 PROCEDURE — 87661 TRICHOMONAS VAGINALIS AMPLIF: CPT | Performed by: FAMILY MEDICINE

## 2022-10-28 PROCEDURE — 84702 CHORIONIC GONADOTROPIN TEST: CPT | Performed by: FAMILY MEDICINE

## 2022-10-28 PROCEDURE — 80053 COMPREHEN METABOLIC PANEL: CPT | Performed by: FAMILY MEDICINE

## 2022-10-28 PROCEDURE — 99284 EMERGENCY DEPT VISIT MOD MDM: CPT

## 2022-10-28 PROCEDURE — 87480 CANDIDA DNA DIR PROBE: CPT | Performed by: FAMILY MEDICINE

## 2022-10-28 PROCEDURE — 81001 URINALYSIS AUTO W/SCOPE: CPT | Performed by: FAMILY MEDICINE

## 2022-10-28 PROCEDURE — 87660 TRICHOMONAS VAGIN DIR PROBE: CPT | Performed by: FAMILY MEDICINE

## 2022-10-28 PROCEDURE — 85025 COMPLETE CBC W/AUTO DIFF WBC: CPT | Performed by: FAMILY MEDICINE

## 2022-10-28 PROCEDURE — 87491 CHLMYD TRACH DNA AMP PROBE: CPT | Performed by: FAMILY MEDICINE

## 2022-10-28 PROCEDURE — 87591 N.GONORRHOEAE DNA AMP PROB: CPT | Performed by: FAMILY MEDICINE

## 2022-10-28 PROCEDURE — 87086 URINE CULTURE/COLONY COUNT: CPT | Performed by: FAMILY MEDICINE

## 2022-10-28 PROCEDURE — 87510 GARDNER VAG DNA DIR PROBE: CPT | Performed by: FAMILY MEDICINE

## 2022-10-28 PROCEDURE — 36415 COLL VENOUS BLD VENIPUNCTURE: CPT

## 2022-10-28 RX ORDER — METRONIDAZOLE 7.5 MG/G
GEL VAGINAL 2 TIMES DAILY
Qty: 70 G | Refills: 0 | Status: SHIPPED | OUTPATIENT
Start: 2022-10-28 | End: 2022-11-04

## 2022-10-28 RX ORDER — NITROFURANTOIN 25; 75 MG/1; MG/1
100 CAPSULE ORAL 2 TIMES DAILY
Qty: 14 CAPSULE | Refills: 0 | Status: SHIPPED | OUTPATIENT
Start: 2022-10-28 | End: 2022-11-22

## 2022-10-29 LAB — BACTERIA SPEC AEROBE CULT: NORMAL

## 2022-10-31 ENCOUNTER — TELEPHONE (OUTPATIENT)
Dept: OBSTETRICS AND GYNECOLOGY | Facility: CLINIC | Age: 22
End: 2022-10-31

## 2022-10-31 DIAGNOSIS — O26.892 RH NEGATIVE STATUS DURING PREGNANCY IN SECOND TRIMESTER: Primary | ICD-10-CM

## 2022-10-31 DIAGNOSIS — Z67.91 RH NEGATIVE STATUS DURING PREGNANCY IN SECOND TRIMESTER: Primary | ICD-10-CM

## 2022-10-31 NOTE — TELEPHONE ENCOUNTER
Called and discussed with the patient that I had been alerted by my  that there was concern that she did not receive an appropriate dose of RhoGAM in the emergency department on 9/30 when she presented with VB in early pregnancy due to the order placed.  Upon review of her record it appears that she received 49.5 mcg of RhoGAM and was 11 weeks and 5 days at the time.  The recommendation is to consider the 50 mcg dose when the fetus is measuring less than 13 weeks.  I discussed with her that we frequently will give a full dose of 300 mcg, especially if we do not know how far along a pregnancy is.  I discussed with her that I do not have any significant concerns as she received very nearly the appropriate dose of RhoGAM for her gestational age when she had vaginal bleeding on 9/30 in the emergency department, but that after speaking with risk-management it would be reasonable to get an antibody screen drawn to ensure no allo immunization had occurred.  The patient is amenable to getting antibody screen drawn and reports understanding.  My  will let risk-management know so that they can work with the emergency department to ensure appropriate ordering of RhoGAM in the future.

## 2022-11-01 ENCOUNTER — LAB (OUTPATIENT)
Dept: LAB | Facility: HOSPITAL | Age: 22
End: 2022-11-01

## 2022-11-01 DIAGNOSIS — Z67.91 RH NEGATIVE STATUS DURING PREGNANCY IN SECOND TRIMESTER: ICD-10-CM

## 2022-11-01 DIAGNOSIS — O26.892 RH NEGATIVE STATUS DURING PREGNANCY IN SECOND TRIMESTER: ICD-10-CM

## 2022-11-01 LAB — BLD GP AB SCN SERPL QL: NEGATIVE

## 2022-11-01 PROCEDURE — 36415 COLL VENOUS BLD VENIPUNCTURE: CPT

## 2022-11-01 PROCEDURE — 86850 RBC ANTIBODY SCREEN: CPT

## 2022-11-22 ENCOUNTER — ROUTINE PRENATAL (OUTPATIENT)
Dept: OBSTETRICS AND GYNECOLOGY | Facility: CLINIC | Age: 22
End: 2022-11-22

## 2022-11-22 VITALS — WEIGHT: 195 LBS | SYSTOLIC BLOOD PRESSURE: 106 MMHG | BODY MASS INDEX: 36.84 KG/M2 | DIASTOLIC BLOOD PRESSURE: 70 MMHG

## 2022-11-22 DIAGNOSIS — R79.89 ELEVATED LIVER FUNCTION TESTS: ICD-10-CM

## 2022-11-22 DIAGNOSIS — K21.9 GASTROESOPHAGEAL REFLUX IN PREGNANCY: ICD-10-CM

## 2022-11-22 DIAGNOSIS — Z67.91 RH NEGATIVE STATUS DURING PREGNANCY IN FIRST TRIMESTER: ICD-10-CM

## 2022-11-22 DIAGNOSIS — O26.891 RH NEGATIVE STATUS DURING PREGNANCY IN FIRST TRIMESTER: ICD-10-CM

## 2022-11-22 DIAGNOSIS — Z78.9 ELECTRONIC CIGARETTE USE: ICD-10-CM

## 2022-11-22 DIAGNOSIS — Z3A.19 19 WEEKS GESTATION OF PREGNANCY: Primary | ICD-10-CM

## 2022-11-22 DIAGNOSIS — N96 HISTORY OF RECURRENT MISCARRIAGES: ICD-10-CM

## 2022-11-22 DIAGNOSIS — O99.211 MATERNAL OBESITY WITHOUT HYPERTENSION, IN FIRST TRIMESTER: ICD-10-CM

## 2022-11-22 DIAGNOSIS — O09.42 SUPERVISION OF HIGH-RISK PREGNANCY WITH GRAND MULTIPARITY IN SECOND TRIMESTER: ICD-10-CM

## 2022-11-22 DIAGNOSIS — O99.619 GASTROESOPHAGEAL REFLUX IN PREGNANCY: ICD-10-CM

## 2022-11-22 PROCEDURE — 0502F SUBSEQUENT PRENATAL CARE: CPT | Performed by: NURSE PRACTITIONER

## 2022-11-22 NOTE — PROGRESS NOTES
CC: Prenatal visit    Shirley Sutton is a 22 y.o.  at 19w4d.  Doing well.  She is moving to [place] soon and needs records transferred.  She will sign release of records at check out today.  Denies contractions, LOF, or VB.  Reports good FM.    /70   Wt 88.5 kg (195 lb)   LMP  (LMP Unknown)   BMI 36.84 kg/m²   Fetal anatomy US preliminary- fetus breech, placenta anterior, no previa, HC 47.6%tile, AC 54.8%tile,  g/11oz, all anatomy views obtained, BOY, CL 3.50 cm            Problems (from 22 to present)     Problem Noted Resolved    Supervision of high-risk pregnancy with grand multiparity in second trimester 10/19/2022 by Corinne Perez DO No    Overview Signed 10/19/2022  8:55 AM by Corinne Perez DO     Rh neg, Obesity         Gastroesophageal reflux in pregnancy 10/19/2022 by Corinne Perez DO No    Overview Signed 10/19/2022  8:57 AM by Corinne Perez DO     Pepcid sent 10/19, recommend Tums prn         History of recurrent miscarriages 2022 by Yasmin Valentine APRN No    Maternal obesity without hypertension, in first trimester 2022 by Yasmin Valentine APRN No    Overview Signed 10/19/2022  8:56 AM by Corinne Perez DO     Borderline 1 hr , recommend dietary moderation and repeat 26 weeks         Elevated liver function tests 2022 by Yasmin Valentine APRN No    Overview Signed 10/19/2022  8:55 AM by Corinne Perez DO     Nml 22         Rh negative status during pregnancy in first trimester 2022 by Yasmin Valentine APRN No    Electronic cigarette use 2022 by Yasmin Valentine APRN No          A/P: Shirley Sutton is a 22 y.o.  at 19w4d.       Diagnosis Plan   1. 19 weeks gestation of pregnancy        2. Supervision of high-risk pregnancy with grand multiparity in second trimester        3. Elevated liver function tests        4. History of recurrent miscarriages         5. Maternal obesity without hypertension, in first trimester        6. Rh negative status during pregnancy in first trimester        7. Electronic cigarette use        8. Gastroesophageal reflux in pregnancy            JAYNE Hensley  11/22/2022  14:09 CST

## 2023-08-12 ENCOUNTER — NURSE TRIAGE (OUTPATIENT)
Dept: CALL CENTER | Facility: HOSPITAL | Age: 23
End: 2023-08-12
Payer: COMMERCIAL

## 2023-08-12 NOTE — TELEPHONE ENCOUNTER
"Reason for Disposition   [1] Blurred vision AND [2] new or worsening    Additional Information   Negative: Followed an eye injury   Negative: Eye pain from chemical in the eye   Negative: Eye pain from foreign body in eye   Negative: [1] Tender, red lump or pimple AND [2] located along the eyelid margin   Negative: Has sinus pain or pressure   Negative: SEVERE eye pain   Negative: Complete loss of vision in one or both eyes   Negative: [1] Eyelids are very swollen (shut or almost) AND [2] fever   Negative: [1] Eyelid (outer) is very red AND [2] fever   Negative: [1] Foreign body sensation (\"feels like something is in there\") AND [2] irrigation didn't help   Negative: Vomiting   Negative: Ulcer or sore seen on the cornea (clear center part of the eye)   Negative: [1] Recent eye surgery AND [2] increasing eye pain    Answer Assessment - Initial Assessment Questions  1. ONSET: \"When did the pain start?\" (e.g., minutes, hours, days)      Several days  2. TIMING: \"Does the pain come and go, or has it been constant since it started?\" (e.g., constant, intermittent, fleeting)         3. SEVERITY: \"How bad is the pain?\"   (Scale 1-10; mild, moderate or severe)    - MILD (1-3): doesn't interfere with normal activities     - MODERATE (4-7): interferes with normal activities or awakens from sleep     - SEVERE (8-10): excruciating pain and patient unable to do normal activities      severe  4. LOCATION: \"Where does it hurt?\"  (e.g., eyelid, eye, cheekbone)      Both eyes  5. CAUSE: \"What do you think is causing the pain?\"      Diagnosis of staph on 08/11/23  6. VISION: \"Do you have blurred vision or changes in your vision?\"       Changes in vision  7. EYE DISCHARGE: \"Is there any discharge (pus) from the eye(s)?\"  If Yes, ask: \"What color is it?\"       denies  8. FEVER: \"Do you have a fever?\" If Yes, ask: \"What is it, how was it measured, and when did it start?\"          9. OTHER SYMPTOMS: \"Do you have any other symptoms?\" " "(e.g., headache, nasal discharge, facial rash)         10. PREGNANCY: \"Is there any chance you are pregnant?\" \"When was your last menstrual period?\"    Protocols used: Eye Pain and Other Symptoms-ADULT-    "